# Patient Record
Sex: MALE | Race: WHITE | NOT HISPANIC OR LATINO | Employment: FULL TIME | URBAN - METROPOLITAN AREA
[De-identification: names, ages, dates, MRNs, and addresses within clinical notes are randomized per-mention and may not be internally consistent; named-entity substitution may affect disease eponyms.]

---

## 2017-11-27 ENCOUNTER — OFFICE VISIT (OUTPATIENT)
Dept: FAMILY MEDICINE CLINIC | Facility: CLINIC | Age: 22
End: 2017-11-27

## 2017-11-27 VITALS
BODY MASS INDEX: 24.79 KG/M2 | DIASTOLIC BLOOD PRESSURE: 80 MMHG | WEIGHT: 167.4 LBS | TEMPERATURE: 98.3 F | SYSTOLIC BLOOD PRESSURE: 136 MMHG | HEART RATE: 108 BPM | HEIGHT: 69 IN | OXYGEN SATURATION: 98 %

## 2017-11-27 DIAGNOSIS — F41.9 ANXIETY: Primary | ICD-10-CM

## 2017-11-27 PROCEDURE — 99213 OFFICE O/P EST LOW 20 MIN: CPT | Performed by: NURSE PRACTITIONER

## 2017-11-27 NOTE — PROGRESS NOTES
"Subjective   Billy Cardona is a 22 y.o. male.     History of Present Illness   Patient presenting to the office today to get back on a medication for anxiety.  Colostomy was here he was given Zoloft he only uses a 4 little while but stopped it because he believes that made him feel drowsy while he was at work.  He was taking the medication in the morning.  He does believe that it helped him with anxiety because his mother people around him noticed that he was much better while he was on the medication.  The following portions of the patient's history were reviewed and updated as appropriate: allergies, current medications, past social history and problem list.    Review of Systems   Constitutional: Negative for fever.   All other systems reviewed and are negative.      Objective   /80 (BP Location: Right arm, Patient Position: Sitting)  Pulse 108  Temp 98.3 °F (36.8 °C)  Ht 69\" (175.3 cm)  Wt 167 lb 6.4 oz (75.9 kg)  SpO2 98%  BMI 24.72 kg/m2  Physical Exam   Constitutional: He is oriented to person, place, and time. Vital signs are normal. He appears well-developed and well-nourished. No distress.   HENT:   Head: Normocephalic.   Cardiovascular: Normal rate, regular rhythm and normal heart sounds.    Pulmonary/Chest: Effort normal and breath sounds normal.   Neurological: He is alert and oriented to person, place, and time. Gait normal.   Psychiatric: He has a normal mood and affect. His behavior is normal. Judgment and thought content normal.   Vitals reviewed.      Assessment/Plan   Problem List Items Addressed This Visit        Other    Anxiety - Primary    Relevant Medications    sertraline (ZOLOFT) 50 MG tablet           rto in 6 weeks if needed   Take med at night   "

## 2018-03-30 ENCOUNTER — OFFICE VISIT (OUTPATIENT)
Dept: FAMILY MEDICINE CLINIC | Facility: CLINIC | Age: 23
End: 2018-03-30

## 2018-03-30 VITALS
HEIGHT: 69 IN | BODY MASS INDEX: 25.45 KG/M2 | WEIGHT: 171.8 LBS | TEMPERATURE: 98.2 F | DIASTOLIC BLOOD PRESSURE: 84 MMHG | SYSTOLIC BLOOD PRESSURE: 130 MMHG | OXYGEN SATURATION: 98 % | HEART RATE: 93 BPM

## 2018-03-30 DIAGNOSIS — R21 RASH: ICD-10-CM

## 2018-03-30 DIAGNOSIS — F41.9 ANXIETY: Primary | ICD-10-CM

## 2018-03-30 PROCEDURE — 99213 OFFICE O/P EST LOW 20 MIN: CPT | Performed by: NURSE PRACTITIONER

## 2018-03-30 RX ORDER — BUPROPION HYDROCHLORIDE 150 MG/1
150 TABLET ORAL DAILY
Qty: 90 TABLET | Refills: 3 | Status: SHIPPED | OUTPATIENT
Start: 2018-03-30 | End: 2019-08-09

## 2018-03-30 RX ORDER — CEPHALEXIN 500 MG/1
500 CAPSULE ORAL 3 TIMES DAILY
Qty: 30 CAPSULE | Refills: 0 | Status: SHIPPED | OUTPATIENT
Start: 2018-03-30 | End: 2019-08-09

## 2018-03-30 NOTE — PROGRESS NOTES
"Subjective   Billy Cardona is a 22 y.o. male.     History of Present Illness   Patient presenting to the office today to follow-up on Zoloft which has done wonders for his anxiety but is causing him to have erectile dysfunction.  He would like to discuss a different medication that will control his anxiety but will not have this side effect.    Patient also presenting to the office with a rash on his lower abdomen that is been there for at least 2 weeks she's not use any medication for it.  The following portions of the patient's history were reviewed and updated as appropriate: allergies, current medications, past social history and problem list.    Review of Systems   Skin: Positive for rash.   All other systems reviewed and are negative.      Objective   /84 (BP Location: Right arm, Patient Position: Sitting)   Pulse 93   Temp 98.2 °F (36.8 °C)   Ht 175.3 cm (69.02\")   Wt 77.9 kg (171 lb 12.8 oz)   SpO2 98%   BMI 25.36 kg/m²   Physical Exam   Constitutional: He is oriented to person, place, and time. Vital signs are normal. He appears well-developed and well-nourished. No distress.   HENT:   Head: Normocephalic.   Cardiovascular: Normal rate, regular rhythm and normal heart sounds.    Pulmonary/Chest: Effort normal and breath sounds normal.   Neurological: He is alert and oriented to person, place, and time. Gait normal.   Psychiatric: He has a normal mood and affect. His behavior is normal. Judgment and thought content normal.   Vitals reviewed.    Rash appears as if it is infection of the hair follicles  Assessment/Plan   Problem List Items Addressed This Visit        Musculoskeletal and Integument    Rash    Relevant Medications    cephalexin (KEFLEX) 500 MG capsule       Other    Anxiety - Primary    Relevant Medications    buPROPion XL (WELLBUTRIN XL) 150 MG 24 hr tablet      Other Visit Diagnoses    None.       rto prn   stop Zoloft and start Wellbutrin  "

## 2019-08-09 ENCOUNTER — OFFICE VISIT (OUTPATIENT)
Dept: FAMILY MEDICINE CLINIC | Facility: CLINIC | Age: 24
End: 2019-08-09

## 2019-08-09 VITALS
HEART RATE: 75 BPM | HEIGHT: 69 IN | TEMPERATURE: 97.9 F | BODY MASS INDEX: 27.16 KG/M2 | WEIGHT: 183.4 LBS | SYSTOLIC BLOOD PRESSURE: 136 MMHG | DIASTOLIC BLOOD PRESSURE: 90 MMHG | OXYGEN SATURATION: 100 %

## 2019-08-09 DIAGNOSIS — Z13.1 SCREENING FOR DIABETES MELLITUS: ICD-10-CM

## 2019-08-09 DIAGNOSIS — R19.7 DIARRHEA, UNSPECIFIED TYPE: ICD-10-CM

## 2019-08-09 DIAGNOSIS — Z13.0 SCREENING FOR IRON DEFICIENCY ANEMIA: ICD-10-CM

## 2019-08-09 DIAGNOSIS — K21.9 GASTROESOPHAGEAL REFLUX DISEASE WITHOUT ESOPHAGITIS: ICD-10-CM

## 2019-08-09 DIAGNOSIS — R14.0 ABDOMINAL BLOATING: Primary | ICD-10-CM

## 2019-08-09 DIAGNOSIS — Z13.6 SCREENING FOR ISCHEMIC HEART DISEASE: ICD-10-CM

## 2019-08-09 PROCEDURE — 99214 OFFICE O/P EST MOD 30 MIN: CPT | Performed by: NURSE PRACTITIONER

## 2019-08-09 RX ORDER — OMEPRAZOLE 20 MG/1
20 CAPSULE, DELAYED RELEASE ORAL DAILY
Qty: 90 CAPSULE | Refills: 3 | Status: SHIPPED | OUTPATIENT
Start: 2019-08-09 | End: 2019-10-29

## 2019-08-09 NOTE — PROGRESS NOTES
"Subjective   Billy Cardona is a 23 y.o. male.     History of Present Illness   Abdominal bloating and back and forth with diarrhea and constipation.  He used tums all the time.  Pepto helps.  Doesn't matter what he eats.  Sometimes its first thing in the morning.  Increase in gas and heartburn. No abdominal pain. This has been happening for years and was tested for lactose intolerance years ago and said he was a little sensitive so stopped with diary products and that didn't help.  Now eats diary. No blood in stool. No v,n,f    The following portions of the patient's history were reviewed and updated as appropriate: allergies, current medications, past social history and problem list.    Review of Systems   Constitutional: Negative for fever.   Respiratory: Negative for cough and shortness of breath.    Cardiovascular: Negative for chest pain.   Gastrointestinal: Positive for abdominal distention and diarrhea. Negative for abdominal pain.   Neurological: Negative for dizziness.       Objective   /90 (BP Location: Left arm, Patient Position: Sitting)   Pulse 75   Temp 97.9 °F (36.6 °C)   Ht 175.3 cm (69\")   Wt 83.2 kg (183 lb 6.4 oz)   SpO2 100%   BMI 27.08 kg/m²   Physical Exam   Constitutional: He is oriented to person, place, and time. Vital signs are normal. He appears well-developed and well-nourished. No distress.   HENT:   Head: Normocephalic.   Cardiovascular: Normal rate, regular rhythm and normal heart sounds.   Pulmonary/Chest: Effort normal and breath sounds normal.   Neurological: He is alert and oriented to person, place, and time. Gait normal.   Psychiatric: He has a normal mood and affect. His behavior is normal. Judgment and thought content normal.   Vitals reviewed.      Assessment/Plan      Diagnosis Plan   1. Abdominal bloating  Food Allergy Profile   2. Gastroesophageal reflux disease without esophagitis  omeprazole (PRILOSEC) 20 MG capsule   3. Diarrhea, unspecified type  Food Allergy " Profile   4. Screening for iron deficiency anemia  CBC & Differential   5. Screening for diabetes mellitus  Comprehensive Metabolic Panel   6. Screening for ischemic heart disease  Lipid Panel With LDL / HDL Ratio     rto in 6 weeks   Follow up after labs     MAK Smith  8/9/2019

## 2019-08-13 LAB
ALBUMIN SERPL-MCNC: 5 G/DL (ref 3.5–5.2)
ALBUMIN/GLOB SERPL: 2.3 G/DL
ALP SERPL-CCNC: 78 U/L (ref 39–117)
ALT SERPL-CCNC: 37 U/L (ref 1–41)
AST SERPL-CCNC: 22 U/L (ref 1–40)
BASOPHILS # BLD AUTO: 0.03 10*3/MM3 (ref 0–0.2)
BASOPHILS NFR BLD AUTO: 0.5 % (ref 0–1.5)
BILIRUB SERPL-MCNC: 0.9 MG/DL (ref 0.2–1.2)
BUN SERPL-MCNC: 11 MG/DL (ref 6–20)
BUN/CREAT SERPL: 10.2 (ref 7–25)
CALCIUM SERPL-MCNC: 9.4 MG/DL (ref 8.6–10.5)
CHLORIDE SERPL-SCNC: 103 MMOL/L (ref 98–107)
CHOLEST SERPL-MCNC: 160 MG/DL (ref 0–200)
CLAM IGE QN: <0.1 KU/L
CO2 SERPL-SCNC: 24 MMOL/L (ref 22–29)
CODFISH IGE QN: <0.1 KU/L
CONV CLASS DESCRIPTION: ABNORMAL
CORN IGE QN: 0.12 KU/L
COW MILK IGE QN: <0.1 KU/L
CREAT SERPL-MCNC: 1.08 MG/DL (ref 0.76–1.27)
EGG WHITE IGE QN: <0.1 KU/L
EOSINOPHIL # BLD AUTO: 0.05 10*3/MM3 (ref 0–0.4)
EOSINOPHIL NFR BLD AUTO: 0.8 % (ref 0.3–6.2)
ERYTHROCYTE [DISTWIDTH] IN BLOOD BY AUTOMATED COUNT: 12.8 % (ref 12.3–15.4)
GLOBULIN SER CALC-MCNC: 2.2 GM/DL
GLUCOSE SERPL-MCNC: 97 MG/DL (ref 65–99)
HCT VFR BLD AUTO: 55.8 % (ref 37.5–51)
HDLC SERPL-MCNC: 44 MG/DL (ref 40–60)
HGB BLD-MCNC: 17.8 G/DL (ref 13–17.7)
IMM GRANULOCYTES # BLD AUTO: 0.02 10*3/MM3 (ref 0–0.05)
IMM GRANULOCYTES NFR BLD AUTO: 0.3 % (ref 0–0.5)
LDLC SERPL CALC-MCNC: 104 MG/DL (ref 0–100)
LDLC/HDLC SERPL: 2.35 {RATIO}
LYMPHOCYTES # BLD AUTO: 2.68 10*3/MM3 (ref 0.7–3.1)
LYMPHOCYTES NFR BLD AUTO: 45 % (ref 19.6–45.3)
MCH RBC QN AUTO: 29.9 PG (ref 26.6–33)
MCHC RBC AUTO-ENTMCNC: 31.9 G/DL (ref 31.5–35.7)
MCV RBC AUTO: 93.8 FL (ref 79–97)
MONOCYTES # BLD AUTO: 0.5 10*3/MM3 (ref 0.1–0.9)
MONOCYTES NFR BLD AUTO: 8.4 % (ref 5–12)
NEUTROPHILS # BLD AUTO: 2.67 10*3/MM3 (ref 1.7–7)
NEUTROPHILS NFR BLD AUTO: 45 % (ref 42.7–76)
NRBC BLD AUTO-RTO: 0 /100 WBC (ref 0–0.2)
PEANUT IGE QN: 0.1 KU/L
PLATELET # BLD AUTO: 260 10*3/MM3 (ref 140–450)
POTASSIUM SERPL-SCNC: 4.8 MMOL/L (ref 3.5–5.2)
PROT SERPL-MCNC: 7.2 G/DL (ref 6–8.5)
RBC # BLD AUTO: 5.95 10*6/MM3 (ref 4.14–5.8)
SCALLOP IGE QN: 0.11 KU/L
SESAME SEED IGE QN: 0.12 KU/L
SHRIMP IGE QN: <0.1 KU/L
SODIUM SERPL-SCNC: 142 MMOL/L (ref 136–145)
SOYBEAN IGE QN: <0.1 KU/L
TRIGL SERPL-MCNC: 62 MG/DL (ref 0–150)
VLDLC SERPL CALC-MCNC: 12.4 MG/DL
WALNUT IGE QN: <0.1 KU/L
WBC # BLD AUTO: 5.95 10*3/MM3 (ref 3.4–10.8)
WHEAT IGE QN: <0.1 KU/L

## 2019-09-20 ENCOUNTER — OFFICE VISIT (OUTPATIENT)
Dept: FAMILY MEDICINE CLINIC | Facility: CLINIC | Age: 24
End: 2019-09-20

## 2019-09-20 VITALS
SYSTOLIC BLOOD PRESSURE: 120 MMHG | TEMPERATURE: 97.8 F | WEIGHT: 182.8 LBS | HEIGHT: 69 IN | DIASTOLIC BLOOD PRESSURE: 74 MMHG | HEART RATE: 83 BPM | BODY MASS INDEX: 27.08 KG/M2 | OXYGEN SATURATION: 98 %

## 2019-09-20 DIAGNOSIS — R14.0 ABDOMINAL BLOATING: ICD-10-CM

## 2019-09-20 DIAGNOSIS — K21.9 GASTROESOPHAGEAL REFLUX DISEASE WITHOUT ESOPHAGITIS: Primary | ICD-10-CM

## 2019-09-20 DIAGNOSIS — R19.7 DIARRHEA, UNSPECIFIED TYPE: ICD-10-CM

## 2019-09-20 PROBLEM — Z13.1 SCREENING FOR DIABETES MELLITUS: Status: RESOLVED | Noted: 2019-08-09 | Resolved: 2019-09-20

## 2019-09-20 PROBLEM — Z13.6 SCREENING FOR ISCHEMIC HEART DISEASE: Status: RESOLVED | Noted: 2019-08-09 | Resolved: 2019-09-20

## 2019-09-20 PROCEDURE — 99213 OFFICE O/P EST LOW 20 MIN: CPT | Performed by: NURSE PRACTITIONER

## 2019-09-20 NOTE — PROGRESS NOTES
"Subjective   Billy Cardona is a 23 y.o. male.     History of Present Illness   Patient presented back to the office today to discuss adding Prilosec for his GI symptoms of bloating heartburn and diarrhea.  Patient states when he first started the medication he thought it was can work well but it ended up not helping him at all he still having off-and-on diarrhea off and on bloating.  He stopped eating the foods that he had a slight allergy to him that food allergy profile has not made any difference at all.  He states that his stomach does hurt worse in the mornings when he gets up before he eats anything at all  The following portions of the patient's history were reviewed and updated as appropriate: allergies, current medications, past social history and problem list.    Review of Systems   HENT: Positive for postnasal drip, rhinorrhea and sinus pressure.    All other systems reviewed and are negative.      Objective   /74 (BP Location: Left arm, Patient Position: Sitting)   Pulse 83   Temp 97.8 °F (36.6 °C)   Ht 175.3 cm (69.02\")   Wt 82.9 kg (182 lb 12.8 oz)   SpO2 98%   BMI 26.98 kg/m²   Physical Exam   Constitutional: He is oriented to person, place, and time. Vital signs are normal. He appears well-developed and well-nourished. No distress.   HENT:   Head: Normocephalic.   Cardiovascular: Normal rate, regular rhythm and normal heart sounds.   Pulmonary/Chest: Effort normal and breath sounds normal.   Neurological: He is alert and oriented to person, place, and time. Gait normal.   Psychiatric: He has a normal mood and affect. His behavior is normal. Judgment and thought content normal.   Vitals reviewed.      Assessment/Plan      Diagnosis Plan   1. Gastroesophageal reflux disease without esophagitis  Ambulatory Referral to Gastroenterology   2. Diarrhea, unspecified type  Ambulatory Referral to Gastroenterology   3. Abdominal bloating  Ambulatory Referral to Gastroenterology     Referral to GI  rto " if needed    Nikos Roth, APRN  9/20/2019

## 2019-10-17 ENCOUNTER — OFFICE VISIT (OUTPATIENT)
Dept: GASTROENTEROLOGY | Facility: CLINIC | Age: 24
End: 2019-10-17

## 2019-10-17 VITALS
SYSTOLIC BLOOD PRESSURE: 126 MMHG | BODY MASS INDEX: 27.13 KG/M2 | TEMPERATURE: 98.2 F | HEIGHT: 69 IN | DIASTOLIC BLOOD PRESSURE: 82 MMHG | WEIGHT: 183.2 LBS

## 2019-10-17 DIAGNOSIS — K52.9 CHRONIC DIARRHEA: ICD-10-CM

## 2019-10-17 DIAGNOSIS — E80.4 GILBERT'S SYNDROME: ICD-10-CM

## 2019-10-17 DIAGNOSIS — R79.89 LFT ELEVATION: Primary | ICD-10-CM

## 2019-10-17 PROCEDURE — 99203 OFFICE O/P NEW LOW 30 MIN: CPT | Performed by: INTERNAL MEDICINE

## 2019-10-17 RX ORDER — CALCIUM CARBONATE 200(500)MG
1 TABLET,CHEWABLE ORAL AS NEEDED
COMMUNITY

## 2019-10-17 RX ORDER — BISMUTH SUBSALICYLATE 262 MG/1
524 TABLET, CHEWABLE ORAL AS NEEDED
COMMUNITY

## 2019-10-17 NOTE — PROGRESS NOTES
Chief Complaint   Patient presents with   • Heartburn     Billy Cardona is a 24 y.o. male who presents with a history of abdominal pain and diarrhea  HPI     Patient 24-year-old male with history of abdominal pain and diarrhea described to others as heartburn though on discussion does not have reflux burning pain intermittently in the epigastric area unrelated to eating or drinking but associated with diarrhea improved with Pepto-Bismol.  Patient denies weight loss no fever chills no bright red blood per rectum or melena.    Past Medical History:   Diagnosis Date   • Anxiety        Current Outpatient Medications:   •  bismuth subsalicylate (PEPTO BISMOL) 262 MG chewable tablet, Chew 524 mg As Needed for Indigestion., Disp: , Rfl:   •  calcium carbonate (TUMS) 500 MG chewable tablet, Chew 1 tablet As Needed for Indigestion or Heartburn., Disp: , Rfl:   •  omeprazole (PRILOSEC) 20 MG capsule, Take 1 capsule by mouth Daily., Disp: 90 capsule, Rfl: 3  No Known Allergies  Social History     Socioeconomic History   • Marital status: Single     Spouse name: Not on file   • Number of children: Not on file   • Years of education: Not on file   • Highest education level: Not on file   Tobacco Use   • Smoking status: Never Smoker   • Smokeless tobacco: Never Used   Substance and Sexual Activity   • Alcohol use: Yes     Family History   Problem Relation Age of Onset   • MIKEL disease Father      Review of Systems   Constitutional: Negative.    HENT: Negative.    Eyes: Negative.    Respiratory: Negative.    Cardiovascular: Negative.    Gastrointestinal: Positive for abdominal distention, abdominal pain and diarrhea. Negative for anal bleeding, blood in stool, constipation, nausea and rectal pain.   Musculoskeletal: Negative.    Skin: Negative.    Allergic/Immunologic: Negative.    Hematological: Negative.      Vitals:    10/17/19 1005   BP: 126/82   Temp: 98.2 °F (36.8 °C)     Physical Exam   Constitutional: He is oriented to  person, place, and time. He appears well-developed and well-nourished.   HENT:   Head: Normocephalic and atraumatic.   Eyes: Conjunctivae and EOM are normal. Pupils are equal, round, and reactive to light. No scleral icterus.   Neck: Normal range of motion. No tracheal deviation present.   Cardiovascular: Normal rate and regular rhythm. Exam reveals no gallop and no friction rub.   No murmur heard.  Pulmonary/Chest: Effort normal and breath sounds normal. No respiratory distress. He has no wheezes. He has no rales.   Abdominal: Soft. Bowel sounds are normal. He exhibits no distension and no mass. There is no tenderness. There is no rebound and no guarding.   Musculoskeletal: Normal range of motion. He exhibits no edema.   Neurological: He is alert and oriented to person, place, and time. No cranial nerve deficit.   Skin: Skin is warm and dry. No pallor.   Psychiatric: He has a normal mood and affect. Judgment normal.   Nursing note and vitals reviewed.    Diagnoses and all orders for this visit:    LFT elevation  -     US Liver; Future    Chronic diarrhea  -     Case Request; Standing  -     Implement Anesthesia orders day of procedure.; Standing  -     Obtain informed consent; Standing  -     Case Request    Gilbert's syndrome    Other orders  -     bismuth subsalicylate (PEPTO BISMOL) 262 MG chewable tablet; Chew 524 mg As Needed for Indigestion.  -     calcium carbonate (TUMS) 500 MG chewable tablet; Chew 1 tablet As Needed for Indigestion or Heartburn.    Patient 24-year-old male with history of abdominal pain particularly in the epigastric region going on for about 8 years with chronic diarrhea.  Patient reports improvement with Pepto-Bismol and momentary improvement with Tums.  Patient reports rectal urgency and loose stools increased with stress.  Patient reports no particular change with eating or drinking.  Patient denies actual heartburn or reflux noted.  Patient given trial of Nexium with no change.  Will  arrange flexible sigmoidoscopy and in view of patient's abnormal LFTs we will get him a right upper quadrant ultrasound.  Patient reports he drinks only rarely maybe once a week 1 or 2 beers usually in the summertime now not at all.  Patient's bilirubin elevation is consistent with Gilbert's but AST was elevated as well.  Await testing for further recommendations.

## 2019-10-28 ENCOUNTER — HOSPITAL ENCOUNTER (OUTPATIENT)
Dept: ULTRASOUND IMAGING | Facility: HOSPITAL | Age: 24
Discharge: HOME | End: 2019-10-28
Admitting: INTERNAL MEDICINE

## 2019-10-28 DIAGNOSIS — R79.89 LFT ELEVATION: ICD-10-CM

## 2019-10-28 PROCEDURE — 76705 ECHO EXAM OF ABDOMEN: CPT

## 2019-10-30 ENCOUNTER — ANESTHESIA (OUTPATIENT)
Dept: GASTROENTEROLOGY | Facility: HOSPITAL | Age: 24
End: 2019-10-30

## 2019-10-30 ENCOUNTER — ANESTHESIA EVENT (OUTPATIENT)
Dept: GASTROENTEROLOGY | Facility: HOSPITAL | Age: 24
End: 2019-10-30

## 2019-10-30 ENCOUNTER — HOSPITAL ENCOUNTER (OUTPATIENT)
Facility: HOSPITAL | Age: 24
Setting detail: HOSPITAL OUTPATIENT SURGERY
Discharge: HOME | End: 2019-10-30
Attending: INTERNAL MEDICINE | Admitting: INTERNAL MEDICINE

## 2019-10-30 VITALS
RESPIRATION RATE: 16 BRPM | WEIGHT: 179.8 LBS | HEART RATE: 78 BPM | HEIGHT: 69 IN | SYSTOLIC BLOOD PRESSURE: 126 MMHG | TEMPERATURE: 97.8 F | BODY MASS INDEX: 26.63 KG/M2 | OXYGEN SATURATION: 99 % | DIASTOLIC BLOOD PRESSURE: 74 MMHG

## 2019-10-30 DIAGNOSIS — K52.9 CHRONIC DIARRHEA: ICD-10-CM

## 2019-10-30 PROCEDURE — 25010000002 PROPOFOL 10 MG/ML EMULSION: Performed by: REGISTERED NURSE

## 2019-10-30 PROCEDURE — 88305 TISSUE EXAM BY PATHOLOGIST: CPT | Performed by: INTERNAL MEDICINE

## 2019-10-30 PROCEDURE — 45380 COLONOSCOPY AND BIOPSY: CPT | Performed by: INTERNAL MEDICINE

## 2019-10-30 RX ORDER — LIDOCAINE HYDROCHLORIDE 10 MG/ML
0.5 INJECTION, SOLUTION INFILTRATION; PERINEURAL ONCE AS NEEDED
Status: DISCONTINUED | OUTPATIENT
Start: 2019-10-30 | End: 2019-10-30 | Stop reason: HOSPADM

## 2019-10-30 RX ORDER — PROPOFOL 10 MG/ML
VIAL (ML) INTRAVENOUS CONTINUOUS PRN
Status: DISCONTINUED | OUTPATIENT
Start: 2019-10-30 | End: 2019-10-30 | Stop reason: SURG

## 2019-10-30 RX ORDER — SODIUM CHLORIDE 0.9 % (FLUSH) 0.9 %
10 SYRINGE (ML) INJECTION AS NEEDED
Status: DISCONTINUED | OUTPATIENT
Start: 2019-10-30 | End: 2019-10-30 | Stop reason: HOSPADM

## 2019-10-30 RX ORDER — LIDOCAINE HYDROCHLORIDE 20 MG/ML
INJECTION, SOLUTION INFILTRATION; PERINEURAL AS NEEDED
Status: DISCONTINUED | OUTPATIENT
Start: 2019-10-30 | End: 2019-10-30 | Stop reason: SURG

## 2019-10-30 RX ORDER — SODIUM CHLORIDE, SODIUM LACTATE, POTASSIUM CHLORIDE, CALCIUM CHLORIDE 600; 310; 30; 20 MG/100ML; MG/100ML; MG/100ML; MG/100ML
1000 INJECTION, SOLUTION INTRAVENOUS CONTINUOUS
Status: DISCONTINUED | OUTPATIENT
Start: 2019-10-30 | End: 2019-10-30 | Stop reason: HOSPADM

## 2019-10-30 RX ORDER — PROPOFOL 10 MG/ML
VIAL (ML) INTRAVENOUS AS NEEDED
Status: DISCONTINUED | OUTPATIENT
Start: 2019-10-30 | End: 2019-10-30 | Stop reason: SURG

## 2019-10-30 RX ADMIN — SODIUM CHLORIDE, POTASSIUM CHLORIDE, SODIUM LACTATE AND CALCIUM CHLORIDE 1000 ML: 600; 310; 30; 20 INJECTION, SOLUTION INTRAVENOUS at 10:31

## 2019-10-30 RX ADMIN — PROPOFOL 150 MCG/KG/MIN: 10 INJECTION, EMULSION INTRAVENOUS at 11:17

## 2019-10-30 RX ADMIN — LIDOCAINE HYDROCHLORIDE 40 MG: 20 INJECTION, SOLUTION INFILTRATION; PERINEURAL at 11:17

## 2019-10-30 RX ADMIN — PROPOFOL 60 MG: 10 INJECTION, EMULSION INTRAVENOUS at 11:17

## 2019-10-30 NOTE — ANESTHESIA POSTPROCEDURE EVALUATION
"Patient: Billy Cardona    Procedure Summary     Date:  10/30/19 Room / Location:   [facility]    Anesthesia Start:  1109 Anesthesia Stop:  1130    Procedure:  FLEXIBLE SIGMOIDOSCOPY into cecum and terminal ileum with biopsies (N/A ) Diagnosis:       Chronic diarrhea      Internal hemorrhoids without complication      (Chronic diarrhea [K52.9])    Surgeon:  Mohit Boyer MD Provider:  Zeina Martin MD    Anesthesia Type:  MAC ASA Status:  2          Anesthesia Type: MAC  Last vitals  BP   126/74 (10/30/19 1135)   Temp   36.6 °C (97.8 °F) (10/30/19 1125)   Pulse   78 (10/30/19 1135)   Resp   16 (10/30/19 1135)     SpO2   99 % (10/30/19 1135)     Post Anesthesia Care and Evaluation    Patient location during evaluation: PHASE II  Patient participation: complete - patient participated  Pain management: adequate  Airway patency: patent  Anesthetic complications: No anesthetic complications    Cardiovascular status: acceptable  Respiratory status: acceptable  Hydration status: acceptable    Comments: /74   Pulse 78   Temp 36.6 °C (97.8 °F)   Resp 16   Ht 175.3 cm (69\")   Wt 81.6 kg (179 lb 12.8 oz)   SpO2 99%   BMI 26.55 kg/m²         "

## 2019-10-30 NOTE — ANESTHESIA PREPROCEDURE EVALUATION
Anesthesia Evaluation     Patient summary reviewed and Nursing notes reviewed   no history of anesthetic complications:  NPO Solid Status: > 8 hours             Airway   Mallampati: II  TM distance: >3 FB  Neck ROM: full  Dental - normal exam     Pulmonary - negative pulmonary ROS and normal exam   Cardiovascular - negative cardio ROS and normal exam  Exercise tolerance: good (4-7 METS)        Neuro/Psych  (+) psychiatric history Anxiety,     GI/Hepatic/Renal/Endo    (+)  GERD,      ROS Comment: Rl smaciej    Musculoskeletal (-) negative ROS    Abdominal  - normal exam    Bowel sounds: normal.   Substance History - negative use     OB/GYN negative ob/gyn ROS         Other                        Anesthesia Plan    ASA 2     MAC     Anesthetic plan, all risks, benefits, and alternatives have been provided, discussed and informed consent has been obtained with: patient.    Plan discussed with CRNA.

## 2019-10-30 NOTE — ANESTHESIA POSTPROCEDURE EVALUATION
"Patient: Billy Cardona    Procedure Summary     Date:  10/30/19 Room / Location:   [facility]    Anesthesia Start:  1109 Anesthesia Stop:  1130    Procedure:  FLEXIBLE SIGMOIDOSCOPY into cecum and terminal ileum with biopsies (N/A ) Diagnosis:       Chronic diarrhea      Internal hemorrhoids without complication      (Chronic diarrhea [K52.9])    Surgeon:  Mohit Boyer MD Provider:  Zeina Martin MD    Anesthesia Type:  MAC ASA Status:  2          Anesthesia Type: MAC  Last vitals  BP   126/74 (10/30/19 1135)   Temp   36.6 °C (97.8 °F) (10/30/19 1125)   Pulse   78 (10/30/19 1135)   Resp   16 (10/30/19 1135)     SpO2   99 % (10/30/19 1135)     Post Anesthesia Care and Evaluation    Patient location during evaluation: PHASE II  Patient participation: complete - patient participated  Level of consciousness: awake  Pain management: adequate  Airway patency: patent  Anesthetic complications: No anesthetic complications    Cardiovascular status: acceptable  Respiratory status: acceptable  Hydration status: acceptable    Comments: /74   Pulse 78   Temp 36.6 °C (97.8 °F)   Resp 16   Ht 175.3 cm (69\")   Wt 81.6 kg (179 lb 12.8 oz)   SpO2 99%   BMI 26.55 kg/m²         "

## 2019-11-01 LAB
CYTO UR: NORMAL
LAB AP CASE REPORT: NORMAL
PATH REPORT.FINAL DX SPEC: NORMAL
PATH REPORT.GROSS SPEC: NORMAL

## 2019-11-07 ENCOUNTER — TELEPHONE (OUTPATIENT)
Dept: GASTROENTEROLOGY | Facility: CLINIC | Age: 24
End: 2019-11-07

## 2019-11-07 NOTE — TELEPHONE ENCOUNTER
----- Message from Mayra Shepherd sent at 11/7/2019  3:14 PM EST -----  Regarding: Results  Contact: [phone]  Pt is calling regarding path results

## 2019-11-25 NOTE — TELEPHONE ENCOUNTER
Notes recorded by Mohit Boyer MD on 11/24/2019 at 4:15 PM EST  Biopsies negative for pathology.  Treat for IBS, try xifaxan 550 tid for 14 days with 2 refills.  If first course not improved will change to pamelor 10 hs and f/u ov

## 2020-02-04 ENCOUNTER — OFFICE VISIT (OUTPATIENT)
Dept: FAMILY MEDICINE CLINIC | Facility: CLINIC | Age: 25
End: 2020-02-04

## 2020-02-04 VITALS
OXYGEN SATURATION: 100 % | BODY MASS INDEX: 27.55 KG/M2 | WEIGHT: 186 LBS | HEART RATE: 85 BPM | SYSTOLIC BLOOD PRESSURE: 120 MMHG | DIASTOLIC BLOOD PRESSURE: 78 MMHG | HEIGHT: 69 IN | TEMPERATURE: 98 F

## 2020-02-04 DIAGNOSIS — F41.9 ANXIETY: Primary | ICD-10-CM

## 2020-02-04 PROCEDURE — 99213 OFFICE O/P EST LOW 20 MIN: CPT | Performed by: NURSE PRACTITIONER

## 2020-02-04 RX ORDER — BUPROPION HYDROCHLORIDE 150 MG/1
150 TABLET ORAL DAILY
Qty: 90 TABLET | Refills: 3 | Status: SHIPPED | OUTPATIENT
Start: 2020-02-04 | End: 2021-06-25

## 2020-02-04 NOTE — PROGRESS NOTES
"Subjective   Billy Cardona is a 24 y.o. male.     History of Present Illness   Patient presented to the office today to restart his anxiety medication.  He is been on tried on different medications in the past for anxiety and the only one that worked was the last one that was given to him by me which was Wellbutrin.  He would like to restart this medication to see if he can cut down on his anxiety and in turn help him with his IBS symptoms.  He is seeing the GI specialist for that and he is getting it under control but he would like to try this as well.  No other problems or complaints today  The following portions of the patient's history were reviewed and updated as appropriate: allergies, current medications, past social history and problem list.    Review of Systems   Psychiatric/Behavioral: The patient is nervous/anxious.    All other systems reviewed and are negative.      Objective   /78 (BP Location: Right arm, Patient Position: Sitting)   Pulse 85   Temp 98 °F (36.7 °C)   Ht 175.3 cm (69.02\")   Wt 84.4 kg (186 lb)   SpO2 100%   BMI 27.45 kg/m²   Physical Exam   Constitutional: He is oriented to person, place, and time. Vital signs are normal. He appears well-developed and well-nourished. No distress.   HENT:   Head: Normocephalic.   Cardiovascular: Normal rate, regular rhythm and normal heart sounds.   Pulmonary/Chest: Effort normal and breath sounds normal.   Neurological: He is alert and oriented to person, place, and time. Gait normal.   Psychiatric: He has a normal mood and affect. His behavior is normal. Judgment and thought content normal.   Vitals reviewed.      Assessment/Plan      Diagnosis Plan   1. Anxiety  buPROPion XL (WELLBUTRIN XL) 150 MG 24 hr tablet     Wellbutrin return to the office in 4 to 6 weeks if needed or not working how you would like for her to work.  Nikos Roth, APRN  2/4/2020  "

## 2021-06-25 ENCOUNTER — OFFICE VISIT (OUTPATIENT)
Dept: FAMILY MEDICINE CLINIC | Facility: CLINIC | Age: 26
End: 2021-06-25

## 2021-06-25 VITALS
OXYGEN SATURATION: 99 % | HEIGHT: 69 IN | BODY MASS INDEX: 27.4 KG/M2 | DIASTOLIC BLOOD PRESSURE: 88 MMHG | WEIGHT: 185 LBS | SYSTOLIC BLOOD PRESSURE: 148 MMHG | TEMPERATURE: 97.6 F | HEART RATE: 74 BPM

## 2021-06-25 DIAGNOSIS — I10 ESSENTIAL HYPERTENSION: Primary | ICD-10-CM

## 2021-06-25 PROCEDURE — 99213 OFFICE O/P EST LOW 20 MIN: CPT | Performed by: NURSE PRACTITIONER

## 2021-06-25 RX ORDER — HYDROCHLOROTHIAZIDE 12.5 MG/1
12.5 CAPSULE, GELATIN COATED ORAL DAILY
Qty: 90 CAPSULE | Refills: 3 | Status: SHIPPED | OUTPATIENT
Start: 2021-06-25 | End: 2021-07-23

## 2021-06-25 NOTE — PROGRESS NOTES
"Chief Complaint  Hypertension (pt had elevated BP of 150/something ~ 1 month ago, has since been checking Bp averaging 140s/80s. Does have worsening headaches)    Subjective          Billy Cardona presents to [facility]  History of Present Illness  Patient presenting to the office today with concerns regarding blood pressure being elevated consistently over the past 2 to 4 weeks.  Patient has been checking his blood pressure at home due to the fact that his mother and father both have hypertension and has been getting readings in the upper 140s low 150s over high 80s.  Patient also has been experiencing headaches during times of elevated blood pressure.    Objective   Vital Signs:   /88 (BP Location: Right arm, Patient Position: Sitting)   Pulse 74   Temp 97.6 °F (36.4 °C)   Ht 175.3 cm (69\")   Wt 83.9 kg (185 lb)   SpO2 99%   BMI 27.32 kg/m²     Physical Exam  Vitals reviewed.   Constitutional:       General: He is not in acute distress.     Appearance: He is well-developed.   HENT:      Head: Normocephalic.   Cardiovascular:      Rate and Rhythm: Normal rate and regular rhythm.      Heart sounds: Normal heart sounds.   Pulmonary:      Effort: Pulmonary effort is normal.      Breath sounds: Normal breath sounds.   Neurological:      Mental Status: He is alert and oriented to person, place, and time.      Gait: Gait normal.   Psychiatric:         Behavior: Behavior normal.         Thought Content: Thought content normal.         Judgment: Judgment normal.        Result Review :                 Assessment and Plan    Diagnoses and all orders for this visit:    1. Essential hypertension (Primary)  -     hydroCHLOROthiazide (MICROZIDE) 12.5 MG capsule; Take 1 capsule by mouth Daily.  Dispense: 90 capsule; Refill: 3        Follow Up   Return in about 4 weeks (around 7/23/2021) for Recheck.  Patient was given instructions and counseling regarding his condition or for health " maintenance advice. Please see specific information pulled into the AVS if appropriate.     Start hydrochlorothiazide 12.5 mg daily return to the office in 4 weeks continue to check blood pressure at home    Mask and googles worn

## 2021-07-23 ENCOUNTER — OFFICE VISIT (OUTPATIENT)
Dept: FAMILY MEDICINE CLINIC | Facility: CLINIC | Age: 26
End: 2021-07-23

## 2021-07-23 VITALS
HEART RATE: 87 BPM | OXYGEN SATURATION: 98 % | SYSTOLIC BLOOD PRESSURE: 140 MMHG | HEIGHT: 69 IN | WEIGHT: 182.6 LBS | BODY MASS INDEX: 27.05 KG/M2 | TEMPERATURE: 98 F | DIASTOLIC BLOOD PRESSURE: 76 MMHG

## 2021-07-23 DIAGNOSIS — Z13.0 SCREENING FOR IRON DEFICIENCY ANEMIA: ICD-10-CM

## 2021-07-23 DIAGNOSIS — Z13.6 SCREENING FOR ISCHEMIC HEART DISEASE: ICD-10-CM

## 2021-07-23 DIAGNOSIS — Z13.1 SCREENING FOR DIABETES MELLITUS: ICD-10-CM

## 2021-07-23 DIAGNOSIS — I10 ESSENTIAL HYPERTENSION: Primary | ICD-10-CM

## 2021-07-23 PROCEDURE — 99213 OFFICE O/P EST LOW 20 MIN: CPT | Performed by: NURSE PRACTITIONER

## 2021-07-23 RX ORDER — AMLODIPINE BESYLATE 5 MG/1
5 TABLET ORAL DAILY
Qty: 30 TABLET | Refills: 1 | Status: SHIPPED | OUTPATIENT
Start: 2021-07-23 | End: 2021-08-12 | Stop reason: SDUPTHER

## 2021-07-23 NOTE — PROGRESS NOTES
"Chief Complaint  Hypertension (Patient is here for 4 week f/u on b/p ( wore mask and goggles) )    Subjective          Billy Cardona presents to [facility]  History of Present Illness  1. HTN-patient presenting to the office today to follow-up on adding hydrochlorothiazide 12.5 mg daily for his blood pressure.  Patient states he has been taking it as directed but his blood pressure has not improved he is consistently running in the 140s over mid 90s and at times the 150s over 90s.  Patient also has complaints of the medication causing him to have cramping and increased headaches.      Objective   Vital Signs:   /76   Pulse 87   Temp 98 °F (36.7 °C)   Ht 175.3 cm (69.02\")   Wt 82.8 kg (182 lb 9.6 oz)   SpO2 98%   BMI 26.95 kg/m²     Physical Exam  Vitals reviewed.   Constitutional:       General: He is not in acute distress.     Appearance: He is well-developed.   HENT:      Head: Normocephalic.   Cardiovascular:      Rate and Rhythm: Normal rate and regular rhythm.      Heart sounds: Normal heart sounds.   Pulmonary:      Effort: Pulmonary effort is normal.      Breath sounds: Normal breath sounds.   Neurological:      Mental Status: He is alert and oriented to person, place, and time.      Gait: Gait normal.   Psychiatric:         Behavior: Behavior normal.         Thought Content: Thought content normal.         Judgment: Judgment normal.        Result Review :                 Assessment and Plan    Diagnoses and all orders for this visit:    1. Essential hypertension (Primary)  -     amLODIPine (NORVASC) 5 MG tablet; Take 1 tablet by mouth Daily.  Dispense: 30 tablet; Refill: 1    2. Screening for iron deficiency anemia  -     CBC & Differential    3. Screening for diabetes mellitus  -     Comprehensive Metabolic Panel    4. Screening for ischemic heart disease  -     Lipid Panel With LDL / HDL Ratio        Follow Up   No follow-ups on file.  Patient was given instructions " and counseling regarding his condition or for health maintenance advice. Please see specific information pulled into the AVS if appropriate.     Stop hydrochlorothiazide start amlodipine 5 mg daily return to the office in 4 weeks.  Follow-up after lab results    Mask and googles worn

## 2021-07-24 LAB
ALBUMIN SERPL-MCNC: 5 G/DL (ref 3.5–5.2)
ALBUMIN/GLOB SERPL: 2.5 G/DL
ALP SERPL-CCNC: 71 U/L (ref 39–117)
ALT SERPL-CCNC: 27 U/L (ref 1–41)
AST SERPL-CCNC: 22 U/L (ref 1–40)
BASOPHILS # BLD AUTO: 0.03 10*3/MM3 (ref 0–0.2)
BASOPHILS NFR BLD AUTO: 0.6 % (ref 0–1.5)
BILIRUB SERPL-MCNC: 1 MG/DL (ref 0–1.2)
BUN SERPL-MCNC: 11 MG/DL (ref 6–20)
BUN/CREAT SERPL: 10.7 (ref 7–25)
CALCIUM SERPL-MCNC: 9.8 MG/DL (ref 8.6–10.5)
CHLORIDE SERPL-SCNC: 106 MMOL/L (ref 98–107)
CHOLEST SERPL-MCNC: 151 MG/DL (ref 0–200)
CO2 SERPL-SCNC: 25.3 MMOL/L (ref 22–29)
CREAT SERPL-MCNC: 1.03 MG/DL (ref 0.76–1.27)
EOSINOPHIL # BLD AUTO: 0.02 10*3/MM3 (ref 0–0.4)
EOSINOPHIL NFR BLD AUTO: 0.4 % (ref 0.3–6.2)
ERYTHROCYTE [DISTWIDTH] IN BLOOD BY AUTOMATED COUNT: 12.6 % (ref 12.3–15.4)
GLOBULIN SER CALC-MCNC: 2 GM/DL
GLUCOSE SERPL-MCNC: 86 MG/DL (ref 65–99)
HCT VFR BLD AUTO: 53.8 % (ref 37.5–51)
HDLC SERPL-MCNC: 39 MG/DL (ref 40–60)
HGB BLD-MCNC: 17.6 G/DL (ref 13–17.7)
IMM GRANULOCYTES # BLD AUTO: 0.01 10*3/MM3 (ref 0–0.05)
IMM GRANULOCYTES NFR BLD AUTO: 0.2 % (ref 0–0.5)
LDLC SERPL CALC-MCNC: 101 MG/DL (ref 0–100)
LDLC/HDLC SERPL: 2.61 {RATIO}
LYMPHOCYTES # BLD AUTO: 2.08 10*3/MM3 (ref 0.7–3.1)
LYMPHOCYTES NFR BLD AUTO: 42.2 % (ref 19.6–45.3)
MCH RBC QN AUTO: 30 PG (ref 26.6–33)
MCHC RBC AUTO-ENTMCNC: 32.7 G/DL (ref 31.5–35.7)
MCV RBC AUTO: 91.7 FL (ref 79–97)
MONOCYTES # BLD AUTO: 0.41 10*3/MM3 (ref 0.1–0.9)
MONOCYTES NFR BLD AUTO: 8.3 % (ref 5–12)
NEUTROPHILS # BLD AUTO: 2.38 10*3/MM3 (ref 1.7–7)
NEUTROPHILS NFR BLD AUTO: 48.3 % (ref 42.7–76)
NRBC BLD AUTO-RTO: 0 /100 WBC (ref 0–0.2)
PLATELET # BLD AUTO: 246 10*3/MM3 (ref 140–450)
POTASSIUM SERPL-SCNC: 5 MMOL/L (ref 3.5–5.2)
PROT SERPL-MCNC: 7 G/DL (ref 6–8.5)
RBC # BLD AUTO: 5.87 10*6/MM3 (ref 4.14–5.8)
SODIUM SERPL-SCNC: 143 MMOL/L (ref 136–145)
TRIGL SERPL-MCNC: 51 MG/DL (ref 0–150)
VLDLC SERPL CALC-MCNC: 11 MG/DL (ref 5–40)
WBC # BLD AUTO: 4.93 10*3/MM3 (ref 3.4–10.8)

## 2021-08-12 DIAGNOSIS — I10 ESSENTIAL HYPERTENSION: ICD-10-CM

## 2021-08-12 RX ORDER — AMLODIPINE BESYLATE 5 MG/1
5 TABLET ORAL DAILY
Qty: 90 TABLET | Refills: 3 | Status: SHIPPED | OUTPATIENT
Start: 2021-08-12 | End: 2022-05-20 | Stop reason: SDUPTHER

## 2021-08-12 NOTE — TELEPHONE ENCOUNTER
Rx Refill Note  Requested Prescriptions     Pending Prescriptions Disp Refills   • amLODIPine (NORVASC) 5 MG tablet 30 tablet 1     Sig: Take 1 tablet by mouth Daily.      Last office visit with prescribing clinician: 7/23/2021      Next office visit with prescribing clinician: 8/19/2021            Petra Lee MA  08/12/21, 10:57 EDT

## 2022-05-20 ENCOUNTER — OFFICE VISIT (OUTPATIENT)
Dept: INTERNAL MEDICINE | Age: 27
End: 2022-05-20

## 2022-05-20 VITALS
BODY MASS INDEX: 26.51 KG/M2 | HEART RATE: 104 BPM | TEMPERATURE: 98.2 F | DIASTOLIC BLOOD PRESSURE: 88 MMHG | SYSTOLIC BLOOD PRESSURE: 144 MMHG | HEIGHT: 69 IN | WEIGHT: 179 LBS | OXYGEN SATURATION: 99 %

## 2022-05-20 DIAGNOSIS — R19.7 DIARRHEA, UNSPECIFIED TYPE: ICD-10-CM

## 2022-05-20 DIAGNOSIS — I10 ESSENTIAL HYPERTENSION: Primary | ICD-10-CM

## 2022-05-20 DIAGNOSIS — B07.9 WART OF HAND: ICD-10-CM

## 2022-05-20 DIAGNOSIS — K21.9 GASTROESOPHAGEAL REFLUX DISEASE WITHOUT ESOPHAGITIS: ICD-10-CM

## 2022-05-20 PROCEDURE — 99395 PREV VISIT EST AGE 18-39: CPT | Performed by: NURSE PRACTITIONER

## 2022-05-20 PROCEDURE — 99214 OFFICE O/P EST MOD 30 MIN: CPT | Performed by: NURSE PRACTITIONER

## 2022-05-20 RX ORDER — AMLODIPINE BESYLATE 5 MG/1
5 TABLET ORAL DAILY
Qty: 30 TABLET | Refills: 0 | Status: SHIPPED | OUTPATIENT
Start: 2022-05-20 | End: 2022-06-19

## 2022-05-20 NOTE — PROGRESS NOTES
"    I N T E R N A L  M E D I C I N E  Gina Harkins, APRN       ENCOUNTER DATE:  05/20/2022    Billy Cardona / 26 y.o. / male    CHIEF COMPLAINT     Establish Care (PREVIOUS TIM EDWARD ), GERD, Hypertension      VITALS     Visit Vitals  /88 (BP Location: Right arm)   Pulse 104   Temp 98.2 °F (36.8 °C)   Ht 175.3 cm (69.02\")   Wt 81.2 kg (179 lb)   SpO2 99%   BMI 26.42 kg/m²       BP Readings from Last 3 Encounters:   05/20/22 144/88   07/23/21 140/76   06/25/21 148/88     Wt Readings from Last 3 Encounters:   05/20/22 81.2 kg (179 lb)   07/23/21 82.8 kg (182 lb 9.6 oz)   06/25/21 83.9 kg (185 lb)      Body mass index is 26.42 kg/m².    Blood pressure readings recorded on patient flowsheet:  No flowsheet data found.     MEDICATIONS     Current Outpatient Medications on File Prior to Visit   Medication Sig Dispense Refill   • bismuth subsalicylate (PEPTO BISMOL) 262 MG chewable tablet Chew 524 mg As Needed for Indigestion.     • calcium carbonate (TUMS) 500 MG chewable tablet Chew 1 tablet As Needed for Indigestion or Heartburn.     • [DISCONTINUED] amLODIPine (NORVASC) 5 MG tablet Take 1 tablet by mouth Daily. 90 tablet 3     No current facility-administered medications on file prior to visit.         HISTORY OF PRESENT ILLNESS      Billy is a 26 year old male whom presents for annual health maintenance visit, hypertension, GERD    Hypertension: Resume Amlodipine 5mg daily as has not been taking it. The problem is un-controlled. Pertinent negatives include no anxiety, blurred vision, chest pain, headaches, peripheral edema, PND, shortness of breath or sweats. Risk factors for coronary artery disease include post-menopausal state, dyslipidemia and diabetes mellitus. Current antihypertension treatment includes ACE inhibitors. Instructed to keep daily BP log for NP.     GERD: continue pepto bismol as needed. Positive for occasional diarrhea, no nausea or vomiting, mild reflux. Patient states seen by GI and " Endoscopy normal. States has tried multiple GERD medications in the past without relief. Discussion of Hx GB disease in family and following. If continues to be an issue would try carafate or linzess.         · General health: fair  · Lifestyle:  · Attempting to lose weight?: No   · Diet: eats moderately healthy  · Exercise: very active at work/home  · Tobacco: Chews Tobacco   · Alcohol: occasional/infrequent  · Work: Full-time  · Reproductive health:  · Sexually active?: No   · Concern for STD?: No   · Sexual problems?: No problems   · Sees Urologist?: No   · Depression Screening:      PHQ-2/PHQ-9 Depression Screening 5/20/2022   Retired Total Score -   Little Interest or Pleasure in Doing Things 0-->not at all   Feeling Down, Depressed or Hopeless 0-->not at all   PHQ-9: Brief Depression Severity Measure Score 0         PHQ-2: 0 (Not depressed)     PHQ-9: 0 (Negative screening for depression)    Patient Care Team:  Gina Harkins APRN as PCP - General (Family Medicine)  ______________________________________________________________________    ALLERGIES  No Known Allergies     PFSH:     The following portions of the patient's history were reviewed and updated as appropriate: Allergies / Current Medications / Past Medical History / Surgical History / Social History / Family History    PROBLEM LIST   Patient Active Problem List   Diagnosis   • Anxiety   • Excessive sweating   • Rash   • Abdominal bloating   • Gastroesophageal reflux disease without esophagitis   • Diarrhea   • Screening for ischemic heart disease   • Screening for diabetes mellitus   • Gilbert's syndrome   • Chronic diarrhea   • Essential hypertension   • Wart of hand       PAST MEDICAL HISTORY  Past Medical History:   Diagnosis Date   • Alternating constipation and diarrhea    • Anxiety    • Blood in stool        SURGICAL HISTORY  Past Surgical History:   Procedure Laterality Date   • NO PAST SURGERIES     • SIGMOIDOSCOPY N/A 10/30/2019     Procedure: FLEXIBLE SIGMOIDOSCOPY into cecum and terminal ileum with biopsies;  Surgeon: Mohit Boyer MD;  Location: [facility] ENDOSCOPY;  Service: Gastroenterology       SOCIAL HISTORY  Social History     Socioeconomic History   • Marital status:    Tobacco Use   • Smoking status: Never Smoker   • Smokeless tobacco: Current User     Types: Chew   • Tobacco comment: CHEWS TOBACCO   Vaping Use   • Vaping Use: Never used   Substance and Sexual Activity   • Alcohol use: Not Currently     Comment: OCCATIONALLY   • Drug use: Never   • Sexual activity: Yes       FAMILY HISTORY  Family History   Problem Relation Age of Onset   • MIKEL disease Father    • Malig Hyperthermia Neg Hx        IMMUNIZATION HISTORY    There is no immunization history on file for this patient.      REVIEW OF SYSTEMS     Review of Systems   Constitutional: Negative.    HENT: Negative.    Eyes: Negative.    Respiratory: Negative.    Cardiovascular: Negative.    Gastrointestinal: Positive for diarrhea.   Endocrine: Negative.    Genitourinary: Negative.    Musculoskeletal: Negative.    Skin:        Wart on right index finger   Allergic/Immunologic: Negative.    Neurological: Negative.    Hematological: Negative.    Psychiatric/Behavioral: Negative.        PHYSICAL EXAMINATION     Physical Exam  Vitals reviewed.   Constitutional:       Appearance: Normal appearance.   HENT:      Head: Normocephalic.      Right Ear: Tympanic membrane normal.      Left Ear: Tympanic membrane normal.      Nose: Nose normal.      Mouth/Throat:      Mouth: Mucous membranes are moist.   Eyes:      Extraocular Movements: Extraocular movements intact.      Pupils: Pupils are equal, round, and reactive to light.   Cardiovascular:      Rate and Rhythm: Normal rate and regular rhythm.      Pulses: Normal pulses.      Heart sounds: Normal heart sounds.   Pulmonary:      Effort: Pulmonary effort is normal.      Breath sounds: Normal breath sounds.   Abdominal:       General: Abdomen is flat. Bowel sounds are normal.   Musculoskeletal:         General: Normal range of motion.      Cervical back: Normal range of motion and neck supple.   Skin:     Capillary Refill: Capillary refill takes less than 2 seconds.      Findings: Lesion present.      Comments: Wart right index finger with tenderness, no redness or drainage noted   Neurological:      Mental Status: He is alert and oriented to person, place, and time.   Psychiatric:         Mood and Affect: Mood normal.         Behavior: Behavior normal.         Thought Content: Thought content normal.         Judgment: Judgment normal.         REVIEWED DATA      Labs:    Lab Results   Component Value Date     07/23/2021    K 5.0 07/23/2021    CALCIUM 9.8 07/23/2021    AST 22 07/23/2021    ALT 27 07/23/2021    BUN 11 07/23/2021    CREATININE 1.03 07/23/2021    CREATININE 1.08 08/09/2019    EGFRIFNONA 88 07/23/2021    EGFRIFAFRI 107 07/23/2021       Lab Results   Component Value Date    GLUCOSE 86 07/23/2021       No results found for: PSA    No results found for: TESTOSTERONE, TESTOSTEROTT, TESTFRE    Lab Results   Component Value Date     (H) 07/23/2021    HDL 39 (L) 07/23/2021    TRIG 51 07/23/2021       No components found for: [id number]    Lab Results   Component Value Date    WBC 4.93 07/23/2021    HGB 17.6 07/23/2021    MCV 91.7 07/23/2021     07/23/2021       No results found for: PROTEIN, GLUCOSEU, BLOODU, NITRITEU, LEUKOCYTESUR     No results found for: HEPCVIRUSABY    Imaging:           Medical Tests:           ASSESSMENT & PLAN     ANNUAL WELLNESS EXAM / PHYSICAL     Other medical problems addressed today:  Problem List Items Addressed This Visit        Cardiac and Vasculature    Essential hypertension - Primary    Relevant Medications    amLODIPine (NORVASC) 5 MG tablet    Other Relevant Orders    CBC w AUTO Differential    Comprehensive metabolic panel    Lipid panel       Gastrointestinal Abdominal      Gastroesophageal reflux disease without esophagitis    Relevant Medications    calcium carbonate (TUMS) 500 MG chewable tablet    Diarrhea       Other    Wart of hand    Relevant Medications    salicylic acid 17 % gel    Other Relevant Orders    Ambulatory Referral to Dermatology          Summary/Discussion:     · Referral to Dermatology for treatment of wart    Return in about 4 weeks (around 6/17/2022) for Recheck.      HEALTHCARE MAINTENANCE ISSUES       Cancer Screening:  · Colon: Initial/Next screening at age: CURRENT  · Repeat colon cancer screening: every 10 years  · Prostate: No screening needed at this time  · Testicular: Recommended monthly self exam  · Skin: Monthly self skin examination, annual exam by health professional  · Lung: Does not meet criteria for lung cancer screening.   · Other:    Screening Labs & Tests:  · Lab results reviewed & discussed with with patient or orders placed today.  · EKG:  · CV Screening: Lipid panel  · DEXA (75+ or risk factors):   · HEP C (If born [birth year]69-8537 or risk factors): Not indicated  · Other:     Immunization/Vaccinations (to be given today unless deferred by patient)  · Influenza: Patient deferred/declined flu vaccine (recommended annual vaccination)  · Hepatitis A: Verify immunization records  · Hepatitis B: Verify immunization records  · Tetanus/Pertussis: Verify immunization records  · Pneumovax/PCV: Not needed at this time  · Shingles: Not needed at this time  · COVID: COVID: Plans to not take the vaccine  Lifestyle Counseling:  · Lifestyle Modifications: Continue good lifestyle choices/modifications, Begin progressive aerobic exercise program 3-5 days a week, Quit chewing tobacco, Continue to abstain/curtail drinking and Reduce exposure to stress if possible  · Safety Issues: Always wear seatbelt, Avoid texting while driving   · Use sunscreen, regular skin examination  · Recommended annual dental/vision examination.  · Emotional/Stress/Sleep: Reviewed and   given when appropriate      Health Maintenance   Topic Date Due   • ANNUAL PHYSICAL  Never done   • COVID-19 Vaccine (1) Never done   • TDAP/TD VACCINES (1 - Tdap) Never done   • HEPATITIS C SCREENING  Never done   • INFLUENZA VACCINE  08/01/2022   • COLORECTAL CANCER SCREENING  10/30/2045   • Pneumococcal Vaccine 0-64  Aged Out           *Examiner was wearing mask protection during the entire duration of the visit. Patient was masked the entire time. Minimum social distance of 6 ft maintained entire visit except if physical contact was necessary as documented.       Template created by MAK Robles

## 2022-05-21 LAB
ALBUMIN SERPL-MCNC: 5.1 G/DL (ref 4.1–5.2)
ALBUMIN/GLOB SERPL: 2.6 {RATIO} (ref 1.2–2.2)
ALP SERPL-CCNC: 67 IU/L (ref 44–121)
ALT SERPL-CCNC: 26 IU/L (ref 0–44)
AST SERPL-CCNC: 22 IU/L (ref 0–40)
BASOPHILS # BLD AUTO: 0 X10E3/UL (ref 0–0.2)
BASOPHILS NFR BLD AUTO: 0 %
BILIRUB SERPL-MCNC: 1.3 MG/DL (ref 0–1.2)
BUN SERPL-MCNC: 12 MG/DL (ref 6–20)
BUN/CREAT SERPL: 11 (ref 9–20)
CALCIUM SERPL-MCNC: 9.5 MG/DL (ref 8.7–10.2)
CHLORIDE SERPL-SCNC: 102 MMOL/L (ref 96–106)
CHOLEST SERPL-MCNC: 141 MG/DL (ref 100–199)
CO2 SERPL-SCNC: 24 MMOL/L (ref 20–29)
CREAT SERPL-MCNC: 1.09 MG/DL (ref 0.76–1.27)
EGFRCR SERPLBLD CKD-EPI 2021: 96 ML/MIN/1.73
EOSINOPHIL # BLD AUTO: 0 X10E3/UL (ref 0–0.4)
EOSINOPHIL NFR BLD AUTO: 0 %
ERYTHROCYTE [DISTWIDTH] IN BLOOD BY AUTOMATED COUNT: 12.7 % (ref 11.6–15.4)
GLOBULIN SER CALC-MCNC: 2 G/DL (ref 1.5–4.5)
GLUCOSE SERPL-MCNC: 82 MG/DL (ref 65–99)
HCT VFR BLD AUTO: 51.2 % (ref 37.5–51)
HDLC SERPL-MCNC: 42 MG/DL
HGB BLD-MCNC: 17.2 G/DL (ref 13–17.7)
IMM GRANULOCYTES # BLD AUTO: 0 X10E3/UL (ref 0–0.1)
IMM GRANULOCYTES NFR BLD AUTO: 0 %
LDLC SERPL CALC-MCNC: 85 MG/DL (ref 0–99)
LYMPHOCYTES # BLD AUTO: 2.1 X10E3/UL (ref 0.7–3.1)
LYMPHOCYTES NFR BLD AUTO: 22 %
MCH RBC QN AUTO: 31.1 PG (ref 26.6–33)
MCHC RBC AUTO-ENTMCNC: 33.6 G/DL (ref 31.5–35.7)
MCV RBC AUTO: 93 FL (ref 79–97)
MONOCYTES # BLD AUTO: 0.7 X10E3/UL (ref 0.1–0.9)
MONOCYTES NFR BLD AUTO: 8 %
NEUTROPHILS # BLD AUTO: 6.4 X10E3/UL (ref 1.4–7)
NEUTROPHILS NFR BLD AUTO: 70 %
PLATELET # BLD AUTO: 246 X10E3/UL (ref 150–450)
POTASSIUM SERPL-SCNC: 4.3 MMOL/L (ref 3.5–5.2)
PROT SERPL-MCNC: 7.1 G/DL (ref 6–8.5)
RBC # BLD AUTO: 5.53 X10E6/UL (ref 4.14–5.8)
SODIUM SERPL-SCNC: 142 MMOL/L (ref 134–144)
TRIGL SERPL-MCNC: 69 MG/DL (ref 0–149)
VLDLC SERPL CALC-MCNC: 14 MG/DL (ref 5–40)
WBC # BLD AUTO: 9.3 X10E3/UL (ref 3.4–10.8)

## 2023-04-04 ENCOUNTER — OFFICE VISIT (OUTPATIENT)
Dept: INTERNAL MEDICINE | Age: 28
End: 2023-04-04
Payer: COMMERCIAL

## 2023-04-04 VITALS
SYSTOLIC BLOOD PRESSURE: 124 MMHG | HEART RATE: 89 BPM | DIASTOLIC BLOOD PRESSURE: 82 MMHG | HEIGHT: 69 IN | OXYGEN SATURATION: 99 % | TEMPERATURE: 98.6 F | WEIGHT: 179.4 LBS | BODY MASS INDEX: 26.57 KG/M2

## 2023-04-04 DIAGNOSIS — F41.9 ANXIETY: ICD-10-CM

## 2023-04-04 DIAGNOSIS — I10 ESSENTIAL HYPERTENSION: ICD-10-CM

## 2023-04-04 DIAGNOSIS — K58.0 IRRITABLE BOWEL SYNDROME WITH DIARRHEA: Primary | ICD-10-CM

## 2023-04-04 RX ORDER — HYDROXYZINE HYDROCHLORIDE 25 MG/1
25 TABLET, FILM COATED ORAL 3 TIMES DAILY PRN
Qty: 60 TABLET | Refills: 0 | Status: SHIPPED | OUTPATIENT
Start: 2023-04-04 | End: 2023-04-05

## 2023-04-04 NOTE — PROGRESS NOTES
Answers for HPI/ROS submitted by the patient on 4/3/2023  What is the primary reason for your visit?: Abdominal Pain  Chronicity: recurrent  Onset: 1 to 4 weeks ago  Onset quality: sudden  Frequency: 2 to 4 times per day  Progression since onset: unchanged  Pain location: generalized abdominal region  Pain - numeric: 5/10  Pain quality: burning  Radiates to: does not radiate  anorexia: Yes  arthralgias: No  belching: Yes  constipation: No  diarrhea: Yes  dysuria: No  fever: No  flatus: Yes  frequency: Yes  headaches: Yes  nausea: Yes  weight loss: No  vomiting: No  Aggravated by: certain positions, movement  Relieved by: belching, bowel movements, passing flatus, standing        I N T E R N A L  M E D I C I MAK Aggarwal    ENCOUNTER DATE:  04/04/2023    Billy COTTO Brendan / 27 y.o. / male      CHIEF COMPLAINT / REASON FOR OFFICE VISIT     Diarrhea (Pt has taken pepto which seemed to help and some immodium) and Abdominal Pain      ASSESSMENT & PLAN     Diagnoses and all orders for this visit:    1. Irritable bowel syndrome with diarrhea (Primary)  Assessment & Plan:  Patient with chronic diarrhea related to IBS. Will try Rifaximin for 2 weeks and follow. Instructed high fiber gummies as directed. Instructed stress management and effects on IBS.       2. Essential hypertension  Assessment & Plan:  Hypertension is controlled off of medication. Continue weight loss measures, exercise, and making healthy food choices. Limit your sodium in your diet and alcohol.       3. Anxiety  Assessment & Plan:  Patient states has tried Zoloft and Wellbutrin in the past and unable to tolerate. Will try Hydroxyzine as directed and follow. Patient states does not want a long acting anxiety medication at this time.       Other orders  -     rifAXIMin (XIFAXAN) 200 MG tablet; Take 1 tablet by mouth 3 (Three) Times a Day As Needed (diarrhea from IBS).  Dispense: 60 tablet; Refill: 0  -     hydrOXYzine (ATARAX) 25 MG tablet; Take 1  "tablet by mouth 3 (Three) Times a Day As Needed for Itching.  Dispense: 60 tablet; Refill: 0       SUMMARY/DISCUSSION  • Follow up with Dr Boyer, Gastroenterology as needed    Return in about 6 months (around 10/4/2023) for Annual physical.      VITAL SIGNS     Visit Vitals  /82 (BP Location: Left arm, Patient Position: Sitting, Cuff Size: Adult)   Pulse 89   Temp 98.6 °F (37 °C) (Temporal)   Ht 175.3 cm (69.02\")   Wt 81.4 kg (179 lb 6.4 oz)   SpO2 99%   BMI 26.48 kg/m²           BP Readings from Last 3 Encounters:   04/04/23 124/82   05/20/22 144/88   07/23/21 140/76     Wt Readings from Last 3 Encounters:   04/04/23 81.4 kg (179 lb 6.4 oz)   05/20/22 81.2 kg (179 lb)   07/23/21 82.8 kg (182 lb 9.6 oz)     Body mass index is 26.48 kg/m².    Blood pressure readings recorded on patient flowsheet:  No flowsheet data found.          MEDICATIONS AT THE TIME OF OFFICE VISIT     Current Outpatient Medications on File Prior to Visit   Medication Sig Dispense Refill   • bismuth subsalicylate (PEPTO BISMOL) 262 MG chewable tablet Chew 2 tablets As Needed for Indigestion.     • calcium carbonate (TUMS) 500 MG chewable tablet Chew 1 tablet As Needed for Indigestion or Heartburn.       No current facility-administered medications on file prior to visit.        HISTORY OF PRESENT ILLNESS     27-year-old male being seen today for concerns for abdominal pain that has been ongoing for the past 4 weeks.      GERD/IBS: States has been taking Pepto-Bismol and Imodium with some relief of his diarrhea.  Patient states general abdominal area with some belching, flatus, especially after eating meals.  Patient states he has tried Protonix in the past without relief. Patient states that he feels like it is stress related.     Anxiety/Stress: patient states that he took Wellbutrin and Zoloft in the past and was not able to tolerate them that they made him feel groggy and unable to work. Patient states would like a prn anxiety " medication and agreeable to Hydroxyzine.     Hypertension: stable off of medications. Patient states limiting sodium in his diet and exercises daily as works on his feet 12 hr days.     Mt Maintenance: patient due for his Hep B series and screening- aware. Annual Physical scheduled.     Patient Care Team:  Gina Harkins APRN as PCP - General (Family Medicine)    REVIEW OF SYSTEMS     Review of Systems   Constitutional: Negative for appetite change and fever.   Gastrointestinal: Positive for abdominal distention, diarrhea and nausea. Negative for constipation and vomiting.   Genitourinary: Negative for dysuria and frequency.   Musculoskeletal: Negative for arthralgias.   Neurological: Positive for headaches. Negative for dizziness.   Psychiatric/Behavioral: The patient is nervous/anxious.           PHYSICAL EXAMINATION     Physical Exam  Constitutional:       General: He is not in acute distress.  Cardiovascular:      Rate and Rhythm: Normal rate and regular rhythm.      Pulses: Normal pulses.      Heart sounds: Normal heart sounds.   Pulmonary:      Effort: Pulmonary effort is normal. No respiratory distress.      Breath sounds: Normal breath sounds.   Abdominal:      General: Bowel sounds are normal. There is distension.      Tenderness: There is no abdominal tenderness. There is no right CVA tenderness or left CVA tenderness.   Musculoskeletal:         General: Normal range of motion.   Skin:     General: Skin is warm and dry.   Neurological:      General: No focal deficit present.      Mental Status: He is alert and oriented to person, place, and time. Mental status is at baseline.      Cranial Nerves: No cranial nerve deficit.   Psychiatric:         Mood and Affect: Mood normal.         Behavior: Behavior normal.         Thought Content: Thought content normal.         Judgment: Judgment normal.             REVIEWED DATA     Labs:     Lab Results   Component Value Date     05/20/2022    K 4.3  05/20/2022    CALCIUM 9.5 05/20/2022    AST 22 05/20/2022    ALT 26 05/20/2022    BUN 12 05/20/2022    CREATININE 1.09 05/20/2022    CREATININE 1.03 07/23/2021    CREATININE 1.08 08/09/2019    EGFRIFNONA 88 07/23/2021    EGFRIFAFRI 107 07/23/2021       No results found for: HGBA1C    Lab Results   Component Value Date    LDL 85 05/20/2022     (H) 07/23/2021     (H) 08/09/2019    HDL 42 05/20/2022    HDL 39 (L) 07/23/2021    TRIG 69 05/20/2022    TRIG 51 07/23/2021       No results found for: TSH, FREET4    Lab Results   Component Value Date    WBC 9.3 05/20/2022    HGB 17.2 05/20/2022     05/20/2022       No results found for: MALBCRERATIO       Imaging:           Medical Tests:           Summary of old records / correspondence / consultant report:           Request outside records:           MAK Robles

## 2023-04-04 NOTE — ASSESSMENT & PLAN NOTE
Patient states has tried Zoloft and Wellbutrin in the past and unable to tolerate. Will try Hydroxyzine as directed and follow. Patient states does not want a long acting anxiety medication at this time.

## 2023-04-04 NOTE — ASSESSMENT & PLAN NOTE
Hypertension is controlled off of medication. Continue weight loss measures, exercise, and making healthy food choices. Limit your sodium in your diet and alcohol.

## 2023-04-04 NOTE — ASSESSMENT & PLAN NOTE
Patient with chronic diarrhea related to IBS. Will try Rifaximin for 2 weeks and follow. Instructed high fiber gummies as directed. Instructed stress management and effects on IBS.

## 2023-04-05 ENCOUNTER — TELEPHONE (OUTPATIENT)
Dept: INTERNAL MEDICINE | Age: 28
End: 2023-04-05

## 2023-04-05 RX ORDER — HYDROXYZINE HYDROCHLORIDE 25 MG/1
25 TABLET, FILM COATED ORAL 3 TIMES DAILY PRN
Qty: 60 TABLET | Refills: 0 | Status: SHIPPED | OUTPATIENT
Start: 2023-04-05

## 2023-04-05 NOTE — TELEPHONE ENCOUNTER
Caller: Billy Cardona    Relationship: Self    Best call back number: [phone]    What was the call regarding: PATIENT SATES HE WAS PRESCRIBED 2 NEW MEDICATIONS YESTERDAY 4/4/23  AT HIS APPOINTMENT AND THEY WERE SENT TO [facility] BUT [facility] NOW STATES THAT THEY DO NOT TAKE THE PATIENT'S INSURANCE. PATIENT IS NEEDING THE MEDICATIONS SENT TO [facility] - [place] [address] - [phone]  - [phone] FX    PLEASE CALL AND ADVISE

## 2023-04-06 DIAGNOSIS — K52.9 CHRONIC DIARRHEA OF UNKNOWN ORIGIN: Primary | ICD-10-CM

## 2023-04-06 NOTE — TELEPHONE ENCOUNTER
Caller: Billy Cardona    Relationship: Self    Best call back number: [phone]    What medications are you currently taking:   Current Outpatient Medications on File Prior to Visit   Medication Sig Dispense Refill   • bismuth subsalicylate (PEPTO BISMOL) 262 MG chewable tablet Chew 2 tablets As Needed for Indigestion.     • calcium carbonate (TUMS) 500 MG chewable tablet Chew 1 tablet As Needed for Indigestion or Heartburn.     • hydrOXYzine (ATARAX) 25 MG tablet Take 1 tablet by mouth 3 (Three) Times a Day As Needed for Itching. 60 tablet 0   • rifAXIMin (XIFAXAN) 200 MG tablet Take 1 tablet by mouth 3 (Three) Times a Day As Needed (diarrhea from IBS). 60 tablet 0     No current facility-administered medications on file prior to visit.          Which medication are you concerned about: XIFAXAN    Who prescribed you this medication: RONALD SQUIRES    What are your concerns: PATIENT WAS TOLD BY PHARMACY THAT HIS XIFAXAN WAS NOT COVERED BY INSURANCE. PLEASE ADVISE IF SHE CAN CHANGED THE MEDICATION TO SOMETHING ELSE.

## 2023-04-17 DIAGNOSIS — K58.0 IRRITABLE BOWEL SYNDROME WITH DIARRHEA: Primary | ICD-10-CM

## 2023-04-17 RX ORDER — ELUXADOLINE 75 MG/1
100 TABLET, FILM COATED ORAL 2 TIMES DAILY PRN
Qty: 60 TABLET | Refills: 0 | Status: SHIPPED | OUTPATIENT
Start: 2023-04-17 | End: 2023-04-18

## 2023-04-17 NOTE — TELEPHONE ENCOUNTER
Caller: Billy Cardona    Relationship: Self    Best call back number: [phone]     PLEASE CALL AND ADVISE PATIENT ON THIS.

## 2023-04-18 ENCOUNTER — OFFICE VISIT (OUTPATIENT)
Dept: INTERNAL MEDICINE | Age: 28
End: 2023-04-18
Payer: COMMERCIAL

## 2023-04-18 ENCOUNTER — TELEPHONE (OUTPATIENT)
Dept: INTERNAL MEDICINE | Age: 28
End: 2023-04-18

## 2023-04-18 VITALS
SYSTOLIC BLOOD PRESSURE: 138 MMHG | WEIGHT: 177.6 LBS | DIASTOLIC BLOOD PRESSURE: 90 MMHG | TEMPERATURE: 97.1 F | OXYGEN SATURATION: 99 % | BODY MASS INDEX: 26.31 KG/M2 | HEART RATE: 80 BPM | HEIGHT: 69 IN

## 2023-04-18 DIAGNOSIS — R42 LIGHTHEADEDNESS: ICD-10-CM

## 2023-04-18 DIAGNOSIS — K58.0 IRRITABLE BOWEL SYNDROME WITH DIARRHEA: ICD-10-CM

## 2023-04-18 DIAGNOSIS — F41.9 ANXIETY: ICD-10-CM

## 2023-04-18 DIAGNOSIS — R53.83 OTHER FATIGUE: ICD-10-CM

## 2023-04-18 DIAGNOSIS — K52.9 CHRONIC DIARRHEA: Primary | ICD-10-CM

## 2023-04-18 LAB
ALBUMIN SERPL-MCNC: 4.6 G/DL (ref 3.5–5.2)
ALBUMIN/GLOB SERPL: 2.3 G/DL
ALP SERPL-CCNC: 69 U/L (ref 39–117)
ALT SERPL-CCNC: 41 U/L (ref 1–41)
AST SERPL-CCNC: 18 U/L (ref 1–40)
BASOPHILS # BLD AUTO: 0.04 10*3/MM3 (ref 0–0.2)
BASOPHILS NFR BLD AUTO: 0.7 % (ref 0–1.5)
BILIRUB SERPL-MCNC: 1.4 MG/DL (ref 0–1.2)
BUN SERPL-MCNC: 11 MG/DL (ref 6–20)
BUN/CREAT SERPL: 10.1 (ref 7–25)
CALCIUM SERPL-MCNC: 9.8 MG/DL (ref 8.6–10.5)
CHLORIDE SERPL-SCNC: 103 MMOL/L (ref 98–107)
CHOLEST SERPL-MCNC: 144 MG/DL (ref 0–200)
CO2 SERPL-SCNC: 27.9 MMOL/L (ref 22–29)
CREAT SERPL-MCNC: 1.09 MG/DL (ref 0.76–1.27)
EGFRCR SERPLBLD CKD-EPI 2021: 95.4 ML/MIN/1.73
EOSINOPHIL # BLD AUTO: 0.02 10*3/MM3 (ref 0–0.4)
EOSINOPHIL NFR BLD AUTO: 0.3 % (ref 0.3–6.2)
ERYTHROCYTE [DISTWIDTH] IN BLOOD BY AUTOMATED COUNT: 12.4 % (ref 12.3–15.4)
GLOBULIN SER CALC-MCNC: 2 GM/DL
GLUCOSE SERPL-MCNC: 97 MG/DL (ref 65–99)
HCT VFR BLD AUTO: 49.4 % (ref 37.5–51)
HDLC SERPL-MCNC: 42 MG/DL (ref 40–60)
HGB BLD-MCNC: 17.1 G/DL (ref 13–17.7)
IMM GRANULOCYTES # BLD AUTO: 0.02 10*3/MM3 (ref 0–0.05)
IMM GRANULOCYTES NFR BLD AUTO: 0.3 % (ref 0–0.5)
LDLC SERPL CALC-MCNC: 87 MG/DL (ref 0–100)
LYMPHOCYTES # BLD AUTO: 1.8 10*3/MM3 (ref 0.7–3.1)
LYMPHOCYTES NFR BLD AUTO: 30.7 % (ref 19.6–45.3)
MCH RBC QN AUTO: 31.1 PG (ref 26.6–33)
MCHC RBC AUTO-ENTMCNC: 34.6 G/DL (ref 31.5–35.7)
MCV RBC AUTO: 90 FL (ref 79–97)
MONOCYTES # BLD AUTO: 0.52 10*3/MM3 (ref 0.1–0.9)
MONOCYTES NFR BLD AUTO: 8.9 % (ref 5–12)
NEUTROPHILS # BLD AUTO: 3.46 10*3/MM3 (ref 1.7–7)
NEUTROPHILS NFR BLD AUTO: 59.1 % (ref 42.7–76)
NRBC BLD AUTO-RTO: 0 /100 WBC (ref 0–0.2)
PLATELET # BLD AUTO: 238 10*3/MM3 (ref 140–450)
POTASSIUM SERPL-SCNC: 4.4 MMOL/L (ref 3.5–5.2)
PROT SERPL-MCNC: 6.6 G/DL (ref 6–8.5)
RBC # BLD AUTO: 5.49 10*6/MM3 (ref 4.14–5.8)
SODIUM SERPL-SCNC: 140 MMOL/L (ref 136–145)
T4 FREE SERPL-MCNC: 1.16 NG/DL (ref 0.93–1.7)
TRIGL SERPL-MCNC: 79 MG/DL (ref 0–150)
TSH SERPL DL<=0.005 MIU/L-ACNC: 0.99 UIU/ML (ref 0.27–4.2)
VLDLC SERPL CALC-MCNC: 15 MG/DL (ref 5–40)
WBC # BLD AUTO: 5.86 10*3/MM3 (ref 3.4–10.8)

## 2023-04-18 RX ORDER — LOPERAMIDE HYDROCHLORIDE 2 MG/1
2 CAPSULE ORAL 4 TIMES DAILY PRN
Qty: 30 CAPSULE | Refills: 0 | Status: SHIPPED | OUTPATIENT
Start: 2023-04-18

## 2023-04-18 RX ORDER — LOPERAMIDE HYDROCHLORIDE 2 MG/1
2 CAPSULE ORAL 4 TIMES DAILY PRN
Qty: 30 CAPSULE | Refills: 0 | Status: SHIPPED | OUTPATIENT
Start: 2023-04-18 | End: 2023-04-18 | Stop reason: SDUPTHER

## 2023-04-18 RX ORDER — LOPERAMIDE HYDROCHLORIDE 2 MG/1
2 CAPSULE ORAL 4 TIMES DAILY PRN
COMMUNITY
End: 2023-04-18 | Stop reason: SDUPTHER

## 2023-04-18 RX ORDER — LOPERAMIDE HYDROCHLORIDE 2 MG/1
2 CAPSULE ORAL 4 TIMES DAILY PRN
Status: CANCELLED | OUTPATIENT
Start: 2023-04-18

## 2023-04-18 NOTE — TELEPHONE ENCOUNTER
Caller: Billy Cardona    Relationship: Self    Best call back number: [phone]    Requested Prescriptions:   Requested Prescriptions     Pending Prescriptions Disp Refills   • loperamide (IMODIUM) 2 MG capsule       Sig: Take 1 capsule by mouth 4 (Four) Times a Day As Needed for Diarrhea.        Pharmacy where request should be sent: [id number] - [facility] [address] [address] AT [place] [address] & [place] [address] - [phone] [facility] [phone] FX     Last office visit with prescribing clinician: 4/18/2023   Last telemedicine visit with prescribing clinician: 10/4/2023   Next office visit with prescribing clinician: 10/4/2023     Additional details provided by patient: PATIENT NEEDS PRESCRIPTION CHANGED TO THIS PHARMACY DUE TO INSURANCE ISSUES. PLEASE CALL WHEN SWITCH IS MADE.    Would you like a call back once the refill request has been completed: [x] Yes [] No    If the office needs to give you a call back, can they leave a voicemail: [x] Yes [] No    Merritt Doyle Rep   04/18/23 09:27 EDT

## 2023-04-18 NOTE — ASSESSMENT & PLAN NOTE
Patient states insurance did not approve Rifaximin, will try Loperamide as directed. Patient instructed to cut out chewing gum, sodas. Patient has an upcoming appointment with GI, Dr Boyer for follow up on chronic diarrhea related to IBS.

## 2023-04-18 NOTE — PROGRESS NOTES
"    I N T E R N A L  M E D I C I N E  Gina Harkins, APRN    ENCOUNTER DATE:  04/18/2023    Billy COTTO Brendan / 27 y.o. / male      CHIEF COMPLAINT / REASON FOR OFFICE VISIT     Fatigue and Labs Only (Requesting thyroid labs)      ASSESSMENT & PLAN     Diagnoses and all orders for this visit:    1. Chronic diarrhea (Primary)  Assessment & Plan:  Patient states insurance did not approve Rifaximin, will try Loperamide as directed. Patient instructed to cut out chewing gum, sodas. Patient has an upcoming appointment with GI, Dr Boyer for follow up on chronic diarrhea related to IBS.     Orders:  -     CBC w AUTO Differential  -     Comprehensive metabolic panel  -     Lipid panel    2. Anxiety  Assessment & Plan:  Continue hydroxyzine 25 mg tid as needed.       3. Lightheadedness  Assessment & Plan:  Will obtain labs to assess for possible thyroid disease, dehydration related to diarrhea    Orders:  -     Lipid panel  -     TSH+Free T4    4. Irritable bowel syndrome with diarrhea  -  loperamide (IMODIUM) 2 MG capsule; Take 1 capsule by mouth 4 (Four) Times a Day As Needed for Diarrhea.  Dispense: 30 capsule; Refill: 0     5. Other Fatigue  Will obtain labs to assess for possible thyroid disease, dehydration related to diarrhea    Next Appointment with me: 4/18/2023        VITAL SIGNS     Visit Vitals  /90 (Cuff Size: Adult)   Pulse 80   Temp 97.1 °F (36.2 °C) (Temporal)   Ht 175.3 cm (69.02\")   Wt 80.6 kg (177 lb 9.6 oz)   SpO2 99%   BMI 26.21 kg/m²           BP Readings from Last 3 Encounters:   04/18/23 138/90   04/04/23 124/82   05/20/22 144/88     Wt Readings from Last 3 Encounters:   04/18/23 80.6 kg (177 lb 9.6 oz)   04/04/23 81.4 kg (179 lb 6.4 oz)   05/20/22 81.2 kg (179 lb)     Body mass index is 26.21 kg/m².    Blood pressure readings recorded on patient flowsheet:       View : No data to display.                      MEDICATIONS AT THE TIME OF OFFICE VISIT     Current Outpatient Medications on File Prior " to Visit   Medication Sig Dispense Refill   • calcium carbonate (TUMS) 500 MG chewable tablet Chew 1 tablet As Needed for Indigestion or Heartburn.     • hydrOXYzine (ATARAX) 25 MG tablet Take 1 tablet by mouth 3 (Three) Times a Day As Needed for Itching. 60 tablet 0   • [DISCONTINUED] loperamide (IMODIUM) 2 MG capsule Take 1 capsule by mouth 4 (Four) Times a Day As Needed for Diarrhea.     • [DISCONTINUED] bismuth subsalicylate (PEPTO BISMOL) 262 MG chewable tablet Chew 2 tablets As Needed for Indigestion. (Patient not taking: Reported on 4/18/2023)     • [DISCONTINUED] Eluxadoline (Viberzi) 75 MG tablet Take 100 mg by mouth 2 (Two) Times a Day As Needed (diarrhea with IBS). (Patient not taking: Reported on 4/18/2023) 60 tablet 0   • [DISCONTINUED] rifAXIMin (XIFAXAN) 200 MG tablet Take 1 tablet by mouth 3 (Three) Times a Day As Needed (diarrhea from IBS). 60 tablet 0     No current facility-administered medications on file prior to visit.        HISTORY OF PRESENT ILLNESS   27 year old male being seen today for ongoing concern for diarrhea and new onset lightheadedness.     Chronic diarrhea/IBD is a chronic problem. Patient states has tried over the counter imodium for the diarrhea without relief. States that he is trying to eat healthier with high fiber foods and still having diarrhea. Patient states that the rifaxamin was not covered by his insurance. Patient states has had this same issue in the past and has been evaluated by Gastroenterology in which they did not find anything during the last colonoscopy.    Lightheadedness and fatigue are new concerns. Patient states that he could be just sitting on the couch watching television and experiences what he describes as a lightheaded feeling, Patient denies any headaches or change of vision. Patient states that he has a family history of Thyroid disease and would like to have labs assessed. Patient denies any dizziness or lightheadedness with head turning side to  side or up and down, and no dizziness when going from a lying to a sitting position. Patient states that he sleeps well and awakens feeling refreshed.     Anxiety is a chronic concern. Patient states that he has only taken the Hydroxyzine as needed and does not feel that he needs longer acting medication at this time.        Patient Care Team:  Gina Harkins APRN as PCP - General (Family Medicine)    REVIEW OF SYSTEMS     Review of Systems   Constitutional: Positive for fatigue. Negative for activity change, appetite change, chills and fever.   Respiratory: Negative for cough, chest tightness and shortness of breath.    Cardiovascular: Negative for chest pain and palpitations.   Gastrointestinal: Positive for diarrhea. Negative for abdominal distention, abdominal pain, blood in stool, constipation, nausea and vomiting.   Neurological: Positive for light-headedness. Negative for dizziness and headaches.   Psychiatric/Behavioral: Negative for sleep disturbance. The patient is not nervous/anxious.           PHYSICAL EXAMINATION     Physical Exam  Constitutional:       General: He is not in acute distress.     Appearance: Normal appearance. He is normal weight.   HENT:      Right Ear: Tympanic membrane, ear canal and external ear normal. There is no impacted cerumen.      Left Ear: Tympanic membrane, ear canal and external ear normal. There is no impacted cerumen.      Nose: Nose normal. No congestion or rhinorrhea.      Mouth/Throat:      Mouth: Mucous membranes are moist.      Pharynx: Oropharynx is clear. No oropharyngeal exudate or posterior oropharyngeal erythema.   Cardiovascular:      Rate and Rhythm: Normal rate and regular rhythm.      Pulses: Normal pulses.      Heart sounds: Normal heart sounds.   Pulmonary:      Effort: Pulmonary effort is normal. No respiratory distress.      Breath sounds: Normal breath sounds. No wheezing, rhonchi or rales.   Chest:      Chest wall: No tenderness.   Abdominal:       General: Bowel sounds are normal. There is no distension.      Palpations: Abdomen is soft.      Tenderness: There is no abdominal tenderness. There is no guarding or rebound.   Musculoskeletal:         General: Normal range of motion.      Cervical back: Normal range of motion and neck supple.   Lymphadenopathy:      Cervical: No cervical adenopathy.   Skin:     General: Skin is warm and dry.   Neurological:      Mental Status: He is alert and oriented to person, place, and time. Mental status is at baseline.      Cranial Nerves: No cranial nerve deficit.   Psychiatric:         Mood and Affect: Mood normal.         Behavior: Behavior normal.         Thought Content: Thought content normal.         Judgment: Judgment normal.             REVIEWED DATA     Labs:     Lab Results   Component Value Date     05/20/2022    K 4.3 05/20/2022    CALCIUM 9.5 05/20/2022    AST 22 05/20/2022    ALT 26 05/20/2022    BUN 12 05/20/2022    CREATININE 1.09 05/20/2022    CREATININE 1.03 07/23/2021    CREATININE 1.08 08/09/2019    EGFRIFNONA 88 07/23/2021    EGFRIFAFRI 107 07/23/2021       No results found for: HGBA1C    Lab Results   Component Value Date    LDL 85 05/20/2022     (H) 07/23/2021     (H) 08/09/2019    HDL 42 05/20/2022    HDL 39 (L) 07/23/2021    TRIG 69 05/20/2022    TRIG 51 07/23/2021       No results found for: TSH, FREET4    Lab Results   Component Value Date    WBC 9.3 05/20/2022    HGB 17.2 05/20/2022     05/20/2022       No results found for: MALBCRERATIO         Imaging:           Medical Tests:           Summary of old records / correspondence / consultant report:           Request outside records:           MAK Robles

## 2023-05-02 ENCOUNTER — TELEPHONE (OUTPATIENT)
Dept: INTERNAL MEDICINE | Age: 28
End: 2023-05-02
Payer: COMMERCIAL

## 2023-05-02 RX ORDER — LOPERAMIDE HYDROCHLORIDE 2 MG/1
2 CAPSULE ORAL 4 TIMES DAILY PRN
Qty: 30 CAPSULE | Refills: 0 | Status: SHIPPED | OUTPATIENT
Start: 2023-05-02

## 2023-05-02 NOTE — TELEPHONE ENCOUNTER
Caller: Billy Cardona    Relationship: Self    Best call back number: [phone]    What medication are you requesting: IBS MEDICATION     What are your current symptoms: PATIENT STATES HE TRIED TO GET THIS LAST WEEK BUT THE OTHER PHARMACY IS NOT COVERED WITH INSURANCE     How long have you been experiencing symptoms: 1 WEEK    Have you had these symptoms before:    [x] Yes  [] No    Have you been treated for these symptoms before:   [x] Yes  [] No    If a prescription is needed, what is your preferred pharmacy and phone number: [id number] - [facility] [address] [facility] [address] [place] [address] & [place] [address] - [phone]  - [phone] FX     Additional notes: PLEASE CALL PATIENT WHEN THIS IS DONE.

## 2023-05-11 ENCOUNTER — TELEPHONE (OUTPATIENT)
Dept: INTERNAL MEDICINE | Age: 28
End: 2023-05-11
Payer: COMMERCIAL

## 2023-05-12 DIAGNOSIS — K58.0 IRRITABLE BOWEL SYNDROME WITH DIARRHEA: Primary | ICD-10-CM

## 2023-05-15 ENCOUNTER — TELEPHONE (OUTPATIENT)
Dept: INTERNAL MEDICINE | Age: 28
End: 2023-05-15
Payer: COMMERCIAL

## 2023-05-15 NOTE — TELEPHONE ENCOUNTER
Caller: Billy Cardona A    Relationship to patient: Self    Best call back number: [phone]    Patient is needing: PATIENTS PHARMACY TOLD HIM THAT THEY NEED A PRIOR AUTHORIZATION FOR THE rifAXIMin (XIFAXAN) 200 MG tablet  PLEASE SEND.

## 2023-05-15 NOTE — TELEPHONE ENCOUNTER
I have spoken with pt several times, pt called his insurance and the reason it keeps being denied is the quantity being prescribed, new pa being sent today.

## 2023-07-13 ENCOUNTER — OFFICE VISIT (OUTPATIENT)
Dept: GASTROENTEROLOGY | Facility: CLINIC | Age: 28
End: 2023-07-13
Payer: COMMERCIAL

## 2023-07-13 VITALS
BODY MASS INDEX: 25.98 KG/M2 | TEMPERATURE: 97.5 F | DIASTOLIC BLOOD PRESSURE: 75 MMHG | HEIGHT: 69 IN | HEART RATE: 87 BPM | OXYGEN SATURATION: 97 % | SYSTOLIC BLOOD PRESSURE: 114 MMHG | WEIGHT: 175.4 LBS

## 2023-07-13 DIAGNOSIS — K52.9 CHRONIC DIARRHEA: Primary | ICD-10-CM

## 2023-07-13 RX ORDER — NORTRIPTYLINE HYDROCHLORIDE 10 MG/1
20 CAPSULE ORAL NIGHTLY
Qty: 60 CAPSULE | Refills: 11 | Status: SHIPPED | OUTPATIENT
Start: 2023-07-13

## 2023-11-27 NOTE — PROGRESS NOTES
Chief Complaint   Patient presents with    Diarrhea       Billy Cardona is a  28 y.o. male here for a follow up visit for IBS-d.    HPI    Pt 26 yo male with h/o IBS here for f/u.  Pt treated with xifaxan in the past denies response here for ongoing symptoms    Past Medical History:   Diagnosis Date    Alternating constipation and diarrhea     Anxiety     Blood in stool     Hypertension     Irritable bowel syndrome          Current Outpatient Medications:     calcium carbonate (TUMS) 500 MG chewable tablet, Chew 1 tablet As Needed for Indigestion or Heartburn., Disp: , Rfl:     hydrOXYzine (ATARAX) 25 MG tablet, Take 1 tablet by mouth 3 (Three) Times a Day As Needed for Itching., Disp: 60 tablet, Rfl: 0    loperamide (IMODIUM) 2 MG capsule, Take 1 capsule by mouth 4 (Four) Times a Day As Needed for Diarrhea., Disp: 30 capsule, Rfl: 0    nortriptyline (PAMELOR) 10 MG capsule, Take 2 capsules by mouth Every Night. Take 1 capsule at night for 1 week then 2 capsules nightly, Disp: 60 capsule, Rfl: 11    No Known Allergies    Social History     Socioeconomic History    Marital status:    Tobacco Use    Smoking status: Never    Smokeless tobacco: Current     Types: Chew    Tobacco comments:     CHEWS TOBACCO   Vaping Use    Vaping Use: Never used   Substance and Sexual Activity    Alcohol use: Yes     Alcohol/week: 4.0 standard drinks of alcohol     Types: 4 Cans of beer per week     Comment: OCCATIONALLY    Drug use: Never    Sexual activity: Yes     Partners: Male       Family History   Problem Relation Age of Onset    MIKEL disease Father     Thyroid disease Father     Hyperlipidemia Mother     Malig Hyperthermia Neg Hx        Review of Systems   Constitutional: Negative.    Respiratory: Negative.     Cardiovascular: Negative.    Gastrointestinal:  Positive for abdominal pain and diarrhea. Negative for abdominal distention, anal bleeding, blood in stool, constipation, nausea and rectal pain.   Musculoskeletal:  Negative.    Skin: Negative.    Hematological: Negative.        Vitals:    07/13/23 1545   BP: 114/75   Pulse: 87   Temp: 97.5 °F (36.4 °C)   SpO2: 97%       Physical Exam  Vitals reviewed.   Constitutional:       Appearance: Normal appearance. He is well-developed.   HENT:      Head: Normocephalic and atraumatic.   Eyes:      General: No scleral icterus.     Pupils: Pupils are equal, round, and reactive to light.   Pulmonary:      Effort: Pulmonary effort is normal. No respiratory distress.      Breath sounds: Normal breath sounds.   Abdominal:      General: Bowel sounds are normal. There is no distension.      Palpations: Abdomen is soft.      Tenderness: There is no abdominal tenderness.   Skin:     General: Skin is warm and dry.      Coloration: Skin is not jaundiced.      Findings: No rash.   Neurological:      General: No focal deficit present.      Mental Status: He is alert and oriented to person, place, and time.      Cranial Nerves: No cranial nerve deficit.   Psychiatric:         Mood and Affect: Mood normal.         Behavior: Behavior normal.         Thought Content: Thought content normal.         No visits with results within 2 Month(s) from this visit.   Latest known visit with results is:   Office Visit on 04/18/2023   Component Date Value Ref Range Status    WBC 04/18/2023 5.86  3.40 - 10.80 10*3/mm3 Final    RBC 04/18/2023 5.49  4.14 - 5.80 10*6/mm3 Final    Hemoglobin 04/18/2023 17.1  13.0 - 17.7 g/dL Final    Hematocrit 04/18/2023 49.4  37.5 - 51.0 % Final    MCV 04/18/2023 90.0  79.0 - 97.0 fL Final    MCH 04/18/2023 31.1  26.6 - 33.0 pg Final    MCHC 04/18/2023 34.6  31.5 - 35.7 g/dL Final    RDW 04/18/2023 12.4  12.3 - 15.4 % Final    Platelets 04/18/2023 238  140 - 450 10*3/mm3 Final    Neutrophil Rel % 04/18/2023 59.1  42.7 - 76.0 % Final    Lymphocyte Rel % 04/18/2023 30.7  19.6 - 45.3 % Final    Monocyte Rel % 04/18/2023 8.9  5.0 - 12.0 % Final    Eosinophil Rel % 04/18/2023 0.3  0.3 - 6.2  % Final    Basophil Rel % 04/18/2023 0.7  0.0 - 1.5 % Final    Neutrophils Absolute 04/18/2023 3.46  1.70 - 7.00 10*3/mm3 Final    Lymphocytes Absolute 04/18/2023 1.80  0.70 - 3.10 10*3/mm3 Final    Monocytes Absolute 04/18/2023 0.52  0.10 - 0.90 10*3/mm3 Final    Eosinophils Absolute 04/18/2023 0.02  0.00 - 0.40 10*3/mm3 Final    Basophils Absolute 04/18/2023 0.04  0.00 - 0.20 10*3/mm3 Final    Immature Granulocyte Rel % 04/18/2023 0.3  0.0 - 0.5 % Final    Immature Grans Absolute 04/18/2023 0.02  0.00 - 0.05 10*3/mm3 Final    nRBC 04/18/2023 0.0  0.0 - 0.2 /100 WBC Final    Glucose 04/18/2023 97  65 - 99 mg/dL Final    BUN 04/18/2023 11  6 - 20 mg/dL Final    Creatinine 04/18/2023 1.09  0.76 - 1.27 mg/dL Final    EGFR Result 04/18/2023 95.4  >60.0 mL/min/1.73 Final    BUN/Creatinine Ratio 04/18/2023 10.1  7.0 - 25.0 Final    Sodium 04/18/2023 140  136 - 145 mmol/L Final    Potassium 04/18/2023 4.4  3.5 - 5.2 mmol/L Final    Chloride 04/18/2023 103  98 - 107 mmol/L Final    Total CO2 04/18/2023 27.9  22.0 - 29.0 mmol/L Final    Calcium 04/18/2023 9.8  8.6 - 10.5 mg/dL Final    Total Protein 04/18/2023 6.6  6.0 - 8.5 g/dL Final    Albumin 04/18/2023 4.6  3.5 - 5.2 g/dL Final    Globulin 04/18/2023 2.0  gm/dL Final    A/G Ratio 04/18/2023 2.3  g/dL Final    Total Bilirubin 04/18/2023 1.4 (H)  0.0 - 1.2 mg/dL Final    Alkaline Phosphatase 04/18/2023 69  39 - 117 U/L Final    AST (SGOT) 04/18/2023 18  1 - 40 U/L Final    ALT (SGPT) 04/18/2023 41  1 - 41 U/L Final    Total Cholesterol 04/18/2023 144  0 - 200 mg/dL Final    Triglycerides 04/18/2023 79  0 - 150 mg/dL Final    HDL Cholesterol 04/18/2023 42  40 - 60 mg/dL Final    VLDL Cholesterol He 04/18/2023 15  5 - 40 mg/dL Final    LDL Chol Calc (NIH) 04/18/2023 87  0 - 100 mg/dL Final    TSH 04/18/2023 0.990  0.270 - 4.200 uIU/mL Final    Free T4 04/18/2023 1.16  0.93 - 1.70 ng/dL Final       Diagnoses and all orders for this visit:    1. Chronic diarrhea  (Primary)    Other orders  -     nortriptyline (PAMELOR) 10 MG capsule; Take 2 capsules by mouth Every Night. Take 1 capsule at night for 1 week then 2 capsules nightly  Dispense: 60 capsule; Refill: 11      Pt with h/o IBS-d not resolved on xifaxan will begin tricyclic for IBS-d symptoms and f/u for response

## 2024-01-19 ENCOUNTER — PATIENT ROUNDING (BHMG ONLY) (OUTPATIENT)
Dept: INTERNAL MEDICINE | Age: 29
End: 2024-01-19
Payer: COMMERCIAL

## 2024-01-19 ENCOUNTER — OFFICE VISIT (OUTPATIENT)
Dept: INTERNAL MEDICINE | Age: 29
End: 2024-01-19
Payer: COMMERCIAL

## 2024-01-19 VITALS
SYSTOLIC BLOOD PRESSURE: 126 MMHG | WEIGHT: 181 LBS | HEART RATE: 98 BPM | DIASTOLIC BLOOD PRESSURE: 78 MMHG | BODY MASS INDEX: 26.81 KG/M2 | OXYGEN SATURATION: 98 % | TEMPERATURE: 97.6 F | HEIGHT: 69 IN

## 2024-01-19 DIAGNOSIS — Z11.59 NEED FOR HEPATITIS C SCREENING TEST: ICD-10-CM

## 2024-01-19 DIAGNOSIS — Z00.00 ROUTINE ADULT HEALTH MAINTENANCE: ICD-10-CM

## 2024-01-19 DIAGNOSIS — F41.1 GENERALIZED ANXIETY DISORDER: Primary | ICD-10-CM

## 2024-01-19 DIAGNOSIS — K64.9 HEMORRHOIDS, UNSPECIFIED HEMORRHOID TYPE: ICD-10-CM

## 2024-01-19 DIAGNOSIS — K58.0 IRRITABLE BOWEL SYNDROME WITH DIARRHEA: ICD-10-CM

## 2024-01-19 RX ORDER — NORTRIPTYLINE HYDROCHLORIDE 10 MG/1
20 CAPSULE ORAL NIGHTLY
Start: 2024-01-19

## 2024-01-19 RX ORDER — BUSPIRONE HYDROCHLORIDE 5 MG/1
5 TABLET ORAL 3 TIMES DAILY
Qty: 90 TABLET | Refills: 2
Start: 2024-01-19

## 2024-01-19 RX ORDER — HYDROCORTISONE ACETATE 25 MG/1
25 SUPPOSITORY RECTAL 2 TIMES DAILY
Qty: 100 EACH | Refills: 1 | Status: SHIPPED | OUTPATIENT
Start: 2024-01-19

## 2024-01-19 NOTE — PROGRESS NOTES
A [employer] message has been sent to the patient for PATIENT ROUNDING with [facility] – [place], [place].

## 2024-01-19 NOTE — PROGRESS NOTES
I N T E R N A L  M E D I C I N E  MAK [facility]OSS      ENCOUNTER DATE:  01/19/2024    Billy COTTO Brendan / 28 y.o. / male      CHIEF COMPLAINT / REASON FOR OFFICE VISIT     Establish Care, Anxiety, Irritable Bowel Syndrome, and Hemorrhoids      ASSESSMENT & PLAN     Problem List Items Addressed This Visit       Irritable bowel syndrome with diarrhea    Relevant Medications    nortriptyline (PAMELOR) 10 MG capsule     Other Visit Diagnoses       Generalized anxiety disorder    -  Primary    Relevant Medications    busPIRone (BUSPAR) 5 MG tablet    nortriptyline (PAMELOR) 10 MG capsule    Need for hepatitis C screening test        Relevant Orders    Hepatitis C antibody    Routine adult health maintenance        Relevant Orders    Comprehensive Metabolic Panel    CBC w AUTO Differential    Lipid Panel With / Chol / HDL Ratio    Urinalysis With Microscopic If Indicated (No Culture) - Urine, Clean Catch    TSH+Free T4    Hemoglobin A1c    Hemorrhoids, unspecified hemorrhoid type        Relevant Medications    hydrocortisone (ANUSOL-HC) 25 MG suppository          Orders Placed This Encounter   Procedures    Comprehensive Metabolic Panel    Lipid Panel With / Chol / HDL Ratio    Urinalysis With Microscopic If Indicated (No Culture) - Urine, Clean Catch    TSH+Free T4    Hemoglobin A1c    Hepatitis C antibody    CBC w AUTO Differential     New Medications Ordered This Visit   Medications    busPIRone (BUSPAR) 5 MG tablet     Sig: Take 1 tablet by mouth 3 (Three) Times a Day.     Dispense:  90 tablet     Refill:  2    nortriptyline (PAMELOR) 10 MG capsule     Sig: Take 2 capsules by mouth Every Night. Take 1 capsule at night for 1 week then 2 capsules nightly    hydrocortisone (ANUSOL-HC) 25 MG suppository     Sig: Insert 1 suppository into the rectum 2 (Two) Times a Day.     Dispense:  100 each     Refill:  1       SUMMARY/DISCUSSION  Side effects with Zoloft, no improvement with Wellbutrin, hydroxyzine just made him  "sleepy.  Will trial BuSpar for anxiety.  He will follow-up via [facility] if no improvement in symptoms  Will treat with hydrocortisone suppositories for hemorrhoids  Annual labs completed today.    Next Appointment with me: Visit date not found    Return in about 1 year (around 1/19/2025) for Annual physical.      VITAL SIGNS     Visit Vitals  /78   Pulse 98   Temp 97.6 °F (36.4 °C)   Ht 175.3 cm (69.02\")   Wt 82.1 kg (181 lb)   SpO2 98%   BMI 26.72 kg/m²     Wt Readings from Last 3 Encounters:   01/19/24 82.1 kg (181 lb)   07/13/23 79.6 kg (175 lb 6.4 oz)   04/18/23 80.6 kg (177 lb 9.6 oz)     Body mass index is 26.72 kg/m².    MEDICATIONS AT THE TIME OF OFFICE VISIT     Current Outpatient Medications on File Prior to Visit   Medication Sig    calcium carbonate (TUMS) 500 MG chewable tablet Chew 1 tablet As Needed for Indigestion or Heartburn.    [DISCONTINUED] nortriptyline (PAMELOR) 10 MG capsule Take 2 capsules by mouth Every Night. Take 1 capsule at night for 1 week then 2 capsules nightly    [DISCONTINUED] hydrOXYzine (ATARAX) 25 MG tablet Take 1 tablet by mouth 3 (Three) Times a Day As Needed for Itching.    [DISCONTINUED] loperamide (IMODIUM) 2 MG capsule Take 1 capsule by mouth 4 (Four) Times a Day As Needed for Diarrhea.     No current facility-administered medications on file prior to visit.      HISTORY OF PRESENT ILLNESS     Patient presents to establish care with new provider in office.    Pelvic gastroenterology, Dr. Boyer in 2019 for colonoscopy with recall recommended at age 45 for routine screening.  Herbal bowel syndrome with diarrhea overall stable with amitriptyline 10 mg daily but does cause intermittent constipation leading to current hemorrhoids.  Has incorporated fiber in his diet with significant improvement in constipation but remaining hemorrhoids    Generalized anxiety long-term.  Failed Zoloft.  Was on Wellbutrin only for short time when he had bowel issues and for the medication.  " "Hydroxyzine made him sleepy.  No recent panic attack.    History of hypertension but blood pressure is well-controlled today.    REVIEW OF SYSTEMS     Constitutional neg except per HPI   Resp neg  CV neg   GI diarrhea stable, hemorrhoids  Psych anxious     PHYSICAL EXAMINATION     Physical Exam  Constitutional  No distress  Cardiovascular Rate  normal . Rhythm: regular . Heart sounds:  normal  Pulmonary/Chest  Effort normal. Breath sounds:  normal  Psychiatric  Alert. Judgment and thought content normal. Mood normal      REVIEWED DATA     Labs:   Lab Results   Component Value Date    GLUCOSE 97 04/18/2023    BUN 11 04/18/2023    CREATININE 1.09 04/18/2023    EGFRRESULT 95.4 04/18/2023    BCR 10.1 04/18/2023    K 4.4 04/18/2023    CO2 27.9 04/18/2023    CALCIUM 9.8 04/18/2023    PROTENTOTREF 6.6 04/18/2023    ALBUMIN 4.6 04/18/2023    BILITOT 1.4 (H) 04/18/2023    AST 18 04/18/2023    ALT 41 04/18/2023     Lab Results   Component Value Date    WBC 5.86 04/18/2023    HGB 17.1 04/18/2023    HCT 49.4 04/18/2023    MCV 90.0 04/18/2023     04/18/2023     Lab Results   Component Value Date    CHLPL 144 04/18/2023    TRIG 79 04/18/2023    HDL 42 04/18/2023    LDL 87 04/18/2023     No results found for: \"HGBA1C\"  Lab Results   Component Value Date    TSH 0.990 04/18/2023     Brief Urine Lab Results       None          Imaging:           Medical Tests:           Summary of old records / correspondence / consultant report:           Request outside records:             *Dragon dictation used for documentation.   "

## 2024-01-20 LAB
ALBUMIN SERPL-MCNC: 5 G/DL (ref 3.5–5.2)
ALBUMIN/GLOB SERPL: 2.8 G/DL
ALP SERPL-CCNC: 65 U/L (ref 39–117)
ALT SERPL-CCNC: 53 U/L (ref 1–41)
APPEARANCE UR: CLEAR
AST SERPL-CCNC: 26 U/L (ref 1–40)
BASOPHILS # BLD AUTO: 0.03 10*3/MM3 (ref 0–0.2)
BASOPHILS NFR BLD AUTO: 0.5 % (ref 0–1.5)
BILIRUB SERPL-MCNC: 1.1 MG/DL (ref 0–1.2)
BILIRUB UR QL STRIP: NEGATIVE
BUN SERPL-MCNC: 13 MG/DL (ref 6–20)
BUN/CREAT SERPL: 12.1 (ref 7–25)
CALCIUM SERPL-MCNC: 9.5 MG/DL (ref 8.6–10.5)
CHLORIDE SERPL-SCNC: 102 MMOL/L (ref 98–107)
CHOLEST SERPL-MCNC: 144 MG/DL (ref 0–200)
CHOLEST/HDLC SERPL: 3.35 {RATIO}
CO2 SERPL-SCNC: 25.3 MMOL/L (ref 22–29)
COLOR UR: YELLOW
CREAT SERPL-MCNC: 1.07 MG/DL (ref 0.76–1.27)
EGFRCR SERPLBLD CKD-EPI 2021: 96.9 ML/MIN/1.73
EOSINOPHIL # BLD AUTO: 0.02 10*3/MM3 (ref 0–0.4)
EOSINOPHIL NFR BLD AUTO: 0.3 % (ref 0.3–6.2)
ERYTHROCYTE [DISTWIDTH] IN BLOOD BY AUTOMATED COUNT: 12.4 % (ref 12.3–15.4)
GLOBULIN SER CALC-MCNC: 1.8 GM/DL
GLUCOSE SERPL-MCNC: 89 MG/DL (ref 65–99)
GLUCOSE UR QL STRIP: NEGATIVE
HBA1C MFR BLD: 5.5 % (ref 4.8–5.6)
HCT VFR BLD AUTO: 52.7 % (ref 37.5–51)
HCV IGG SERPL QL IA: NON REACTIVE
HDLC SERPL-MCNC: 43 MG/DL (ref 40–60)
HGB BLD-MCNC: 18.4 G/DL (ref 13–17.7)
HGB UR QL STRIP: NEGATIVE
IMM GRANULOCYTES # BLD AUTO: 0.02 10*3/MM3 (ref 0–0.05)
IMM GRANULOCYTES NFR BLD AUTO: 0.3 % (ref 0–0.5)
KETONES UR QL STRIP: NEGATIVE
LDLC SERPL CALC-MCNC: 84 MG/DL (ref 0–100)
LEUKOCYTE ESTERASE UR QL STRIP: NEGATIVE
LYMPHOCYTES # BLD AUTO: 2.01 10*3/MM3 (ref 0.7–3.1)
LYMPHOCYTES NFR BLD AUTO: 32.2 % (ref 19.6–45.3)
MCH RBC QN AUTO: 31 PG (ref 26.6–33)
MCHC RBC AUTO-ENTMCNC: 34.9 G/DL (ref 31.5–35.7)
MCV RBC AUTO: 88.9 FL (ref 79–97)
MONOCYTES # BLD AUTO: 0.63 10*3/MM3 (ref 0.1–0.9)
MONOCYTES NFR BLD AUTO: 10.1 % (ref 5–12)
NEUTROPHILS # BLD AUTO: 3.54 10*3/MM3 (ref 1.7–7)
NEUTROPHILS NFR BLD AUTO: 56.6 % (ref 42.7–76)
NITRITE UR QL STRIP: NEGATIVE
NRBC BLD AUTO-RTO: 0 /100 WBC (ref 0–0.2)
PH UR STRIP: 6 [PH] (ref 5–8)
PLATELET # BLD AUTO: 273 10*3/MM3 (ref 140–450)
POTASSIUM SERPL-SCNC: 4.5 MMOL/L (ref 3.5–5.2)
PROT SERPL-MCNC: 6.8 G/DL (ref 6–8.5)
PROT UR QL STRIP: NEGATIVE
RBC # BLD AUTO: 5.93 10*6/MM3 (ref 4.14–5.8)
SODIUM SERPL-SCNC: 138 MMOL/L (ref 136–145)
SP GR UR STRIP: 1.02 (ref 1–1.03)
T4 FREE SERPL-MCNC: 1.25 NG/DL (ref 0.93–1.7)
TRIGL SERPL-MCNC: 87 MG/DL (ref 0–150)
TSH SERPL DL<=0.005 MIU/L-ACNC: 1.09 UIU/ML (ref 0.27–4.2)
UROBILINOGEN UR STRIP-MCNC: NORMAL MG/DL
VLDLC SERPL CALC-MCNC: 17 MG/DL (ref 5–40)
WBC # BLD AUTO: 6.25 10*3/MM3 (ref 3.4–10.8)

## 2024-01-21 DIAGNOSIS — R74.01 ELEVATED ALT MEASUREMENT: Primary | ICD-10-CM

## 2024-01-21 DIAGNOSIS — D58.2 ELEVATED HEMOGLOBIN: ICD-10-CM

## 2024-01-22 ENCOUNTER — TELEPHONE (OUTPATIENT)
Dept: INTERNAL MEDICINE | Age: 29
End: 2024-01-22

## 2024-01-22 RX ORDER — NORTRIPTYLINE HYDROCHLORIDE 10 MG/1
CAPSULE ORAL
Qty: 60 CAPSULE | Refills: 11 | Status: SHIPPED | OUTPATIENT
Start: 2024-01-22

## 2024-01-22 NOTE — TELEPHONE ENCOUNTER
Hub staff attempted to follow warm transfer process and was unsuccessful     Caller: Billy Cardona    Relationship to patient: Self    Best call back number: [phone]    Patient is needing: LAB RECHECK, PER GIFT[facility] LAWS

## 2024-01-23 DIAGNOSIS — F41.1 GENERALIZED ANXIETY DISORDER: ICD-10-CM

## 2024-01-23 RX ORDER — BUSPIRONE HYDROCHLORIDE 5 MG/1
5 TABLET ORAL 3 TIMES DAILY
Qty: 90 TABLET | Refills: 1 | Status: SHIPPED | OUTPATIENT
Start: 2024-01-23

## 2024-01-23 RX ORDER — BUSPIRONE HYDROCHLORIDE 5 MG/1
5 TABLET ORAL 3 TIMES DAILY
Qty: 90 TABLET | Refills: 1 | Status: SHIPPED | OUTPATIENT
Start: 2024-01-23 | End: 2024-01-23

## 2024-02-26 DIAGNOSIS — F41.1 GENERALIZED ANXIETY DISORDER: Primary | ICD-10-CM

## 2024-02-26 RX ORDER — BUSPIRONE HYDROCHLORIDE 10 MG/1
10 TABLET ORAL 3 TIMES DAILY
Qty: 270 TABLET | Refills: 1 | Status: SHIPPED | OUTPATIENT
Start: 2024-02-26

## 2024-02-27 DIAGNOSIS — R79.89 ELEVATED FERRITIN: Primary | ICD-10-CM

## 2024-02-27 DIAGNOSIS — R74.8 ELEVATED LIVER ENZYMES: ICD-10-CM

## 2024-02-28 DIAGNOSIS — D58.2 ELEVATED HEMOGLOBIN: Primary | ICD-10-CM

## 2024-02-28 DIAGNOSIS — R74.8 ELEVATED LIVER ENZYMES: ICD-10-CM

## 2024-03-31 NOTE — TELEPHONE ENCOUNTER
Caller: BrendanBilly    Relationship: Self    Best call back number: [phone]   Medication needed:   Requested Prescriptions     Pending Prescriptions Disp Refills   • amLODIPine (NORVASC) 5 MG tablet 30 tablet 1     Sig: Take 1 tablet by mouth Daily.       When do you need the refill by: ASAP    What additional details did the patient provide when requesting the medication: CONFIRM MEDICATION IS FOR BLOOD PRESSURE     Does the patient have less than a 3 day supply:  [x] Yes  [] No    What is the patient's preferred pharmacy: [facility] [address] - [phone]  - [phone] FX            The patient is a 32y Male complaining of cp

## 2024-04-05 ENCOUNTER — TELEPHONE (OUTPATIENT)
Dept: INTERNAL MEDICINE | Age: 29
End: 2024-04-05

## 2024-08-05 DIAGNOSIS — K58.0 IRRITABLE BOWEL SYNDROME WITH DIARRHEA: ICD-10-CM

## 2024-08-05 RX ORDER — NORTRIPTYLINE HYDROCHLORIDE 10 MG/1
20 CAPSULE ORAL NIGHTLY
Start: 2024-08-05 | End: 2024-08-06 | Stop reason: SDUPTHER

## 2024-08-05 RX ORDER — NORTRIPTYLINE HYDROCHLORIDE 10 MG/1
CAPSULE ORAL
Qty: 60 CAPSULE | Refills: 11 | OUTPATIENT
Start: 2024-08-05

## 2024-08-06 DIAGNOSIS — K58.0 IRRITABLE BOWEL SYNDROME WITH DIARRHEA: ICD-10-CM

## 2024-08-06 RX ORDER — NORTRIPTYLINE HYDROCHLORIDE 10 MG/1
20 CAPSULE ORAL NIGHTLY
Status: CANCELLED
Start: 2024-08-06

## 2024-08-06 RX ORDER — NORTRIPTYLINE HYDROCHLORIDE 10 MG/1
20 CAPSULE ORAL NIGHTLY
Start: 2024-08-06 | End: 2024-08-09 | Stop reason: SDUPTHER

## 2024-08-06 NOTE — TELEPHONE ENCOUNTER
Caller: Billy Cardona    Relationship: Self    Best call back number: [phone]     Requested Prescriptions:   Requested Prescriptions     Pending Prescriptions Disp Refills    nortriptyline (PAMELOR) 10 MG capsule       Sig: Take 2 capsules by mouth Every Night. Take 1 capsule at night for 1 week then 2 capsules nightly        Pharmacy where request should be sent:  [facility]    Last office visit with prescribing clinician: 7/13/2023   Last telemedicine visit with prescribing clinician: Visit date not found   Next office visit with prescribing clinician: Visit date not found     Additional details provided by patient: N/A    Does the patient have less than a 3 day supply:  [x] Yes  [] No    Would you like a call back once the refill request has been completed: [] Yes [x] No    If the office needs to give you a call back, can they leave a voicemail: [] Yes [x] No    Merritt Aparicio Rep   08/06/24 12:58 EDT

## 2024-08-09 ENCOUNTER — TELEPHONE (OUTPATIENT)
Dept: GASTROENTEROLOGY | Facility: CLINIC | Age: 29
End: 2024-08-09
Payer: COMMERCIAL

## 2024-08-09 DIAGNOSIS — K58.0 IRRITABLE BOWEL SYNDROME WITH DIARRHEA: ICD-10-CM

## 2024-08-09 RX ORDER — NORTRIPTYLINE HYDROCHLORIDE 10 MG/1
20 CAPSULE ORAL NIGHTLY
Qty: 60 CAPSULE | Refills: 0
Start: 2024-08-09

## 2024-08-09 NOTE — TELEPHONE ENCOUNTER
DELETE AFTER REVIEWING: Send this encounter to the appropriate pool. See your Call Action Grid or Workflows for direction.        Hub staff attempted to follow warm transfer process and was unsuccessful     Caller: Billy Cardona A    Relationship to patient: Self    Best call back number: [phone]     Patient is needing: PATIENT REQUESTED A REFILL FOR HIS nortriptyline (PAMELOR) 10 MG capsule BUT NEEDED A YEARLY FOLLOW UP SCHEDULED.  IT IS SCHEDULED FOR 8/30/24.  PATIENT IS COMPLETELY OUT OF HIS MEDICATION SO NEEDS THIS REFILLED AS SOON AS POSSIBLE.  PLEASE CALL PATIENT IF THERE ANY ISSUES.

## 2024-08-10 DIAGNOSIS — K58.0 IRRITABLE BOWEL SYNDROME WITH DIARRHEA: ICD-10-CM

## 2024-08-15 ENCOUNTER — TELEPHONE (OUTPATIENT)
Dept: INTERNAL MEDICINE | Age: 29
End: 2024-08-15
Payer: COMMERCIAL

## 2024-08-20 RX ORDER — NORTRIPTYLINE HYDROCHLORIDE 10 MG/1
20 CAPSULE ORAL NIGHTLY
Qty: 180 CAPSULE | Refills: 2 | Status: SHIPPED | OUTPATIENT
Start: 2024-08-20

## 2024-08-30 ENCOUNTER — OFFICE VISIT (OUTPATIENT)
Dept: GASTROENTEROLOGY | Facility: CLINIC | Age: 29
End: 2024-08-30
Payer: COMMERCIAL

## 2024-08-30 VITALS
TEMPERATURE: 98.4 F | HEIGHT: 71 IN | SYSTOLIC BLOOD PRESSURE: 138 MMHG | WEIGHT: 186.2 LBS | HEART RATE: 98 BPM | DIASTOLIC BLOOD PRESSURE: 89 MMHG | BODY MASS INDEX: 26.07 KG/M2

## 2024-08-30 DIAGNOSIS — K62.5 RECTAL BLEEDING: ICD-10-CM

## 2024-08-30 DIAGNOSIS — K58.0 IRRITABLE BOWEL SYNDROME WITH DIARRHEA: ICD-10-CM

## 2024-08-30 DIAGNOSIS — K62.89 ANAL OR RECTAL PAIN: ICD-10-CM

## 2024-08-30 DIAGNOSIS — K64.8 INTERNAL HEMORRHOIDS: Primary | ICD-10-CM

## 2024-08-30 PROCEDURE — 99214 OFFICE O/P EST MOD 30 MIN: CPT | Performed by: NURSE PRACTITIONER

## 2024-08-30 NOTE — PROGRESS NOTES
Chief Complaint   Patient presents with    Irritable Bowel Syndrome       HPI    Billy Cardona is a  28 y.o. male here for a follow up visit for irritable bowel syndrome with diarrhea.    Past medical history of anxiety and hypertension.     Seen today for routine follow-up of irritable bowel syndrome with adequate control of symptoms on nightly Pamelor.  He does mention issues with hemorrhoids starting roughly 6 months ago with exam and feelings of a protruding hemorrhoid that can be uncomfortable and cause rectal bleeding with bright red blood on the toilet paper.  He also feels like he passed some blood clots lately.  Denies diarrhea, constipation or abdominal pain.  His appetite is good.  His weight is stable.  He has been treated with topical agents in the form of Anusol with no change in symptoms.    Recent CBC normal.    C-scope 2019 (reviewed) - Nonbleeding internal hemorrhoids otherwise normal.  Recall age 50.    Past Medical History:   Diagnosis Date    Alternating constipation and diarrhea     Anxiety     Blood in stool     Hypertension     Irritable bowel syndrome        Past Surgical History:   Procedure Laterality Date    COLONOSCOPY      NO PAST SURGERIES      SIGMOIDOSCOPY N/A 10/30/2019    Procedure: FLEXIBLE SIGMOIDOSCOPY into cecum and terminal ileum with biopsies;  Surgeon: Mohit Boyer MD;  Location: [facility] ENDOSCOPY;  Service: Gastroenterology       Scheduled Meds:     Continuous Infusions: No current facility-administered medications for this visit.      PRN Meds:     No Known Allergies    Social History     Socioeconomic History    Marital status:    Tobacco Use    Smoking status: Never    Smokeless tobacco: Current     Types: Chew    Tobacco comments:     CHEWS TOBACCO   Vaping Use    Vaping status: Never Used   Substance and Sexual Activity    Alcohol use: Yes     Alcohol/week: 4.0 standard drinks of alcohol     Types: 4 Cans of beer per week     Comment: OCCATIONALLY     Drug use: Never    Sexual activity: Yes     Partners: Male       Family History   Problem Relation Age of Onset    MIKEL disease Father     Thyroid disease Father     Hyperlipidemia Mother     Malig Hyperthermia Neg Hx        Review of Systems   Gastrointestinal:  Positive for rectal pain.       Vitals:    08/30/24 1436   BP: 138/89   Pulse: 98   Temp: 98.4 °F (36.9 °C)       Physical Exam  Constitutional:       Appearance: He is well-developed.   Abdominal:      General: Bowel sounds are normal. There is no distension.      Palpations: Abdomen is soft. There is no mass.      Tenderness: There is no abdominal tenderness. There is no guarding.      Hernia: No hernia is present.   Genitourinary:     Rectum: Tenderness present. No anal fissure.      Comments: Rectal examination suspicious for small thrombosed internal hemorrhoid  Skin:     General: Skin is warm and dry.      Capillary Refill: Capillary refill takes less than 2 seconds.   Neurological:      Mental Status: He is alert and oriented to person, place, and time.   Psychiatric:         Behavior: Behavior normal.       Assessment    Diagnoses and all orders for this visit:    1. Internal hemorrhoids (Primary)  -     Ambulatory Referral to Colorectal Surgery    2. Anal or rectal pain  -     Ambulatory Referral to Colorectal Surgery    3. Rectal bleeding  -     Ambulatory Referral to Colorectal Surgery    4. Irritable bowel syndrome with diarrhea       Plan    Patient complaining of rectal discomfort with rectal bleeding and findings of possible small thrombosed internal hemorrhoid on rectal examination.  Symptoms have not improved with topical agents.  As such we will refer to Dr. Alicea for further evaluation of symptoms.  Continue nightly Pamelor for IBS-D.  High-fiber diet.  Avoid prolonged sitting.  Further recommendations pending assessment by colorectal surgery.         MAK Kearney  [facility]  [address] [place] [address]  [address]  [place] [address]  Office: [phone]

## 2024-10-14 ENCOUNTER — OFFICE VISIT (OUTPATIENT)
Dept: SURGERY | Facility: CLINIC | Age: 29
End: 2024-10-14
Payer: COMMERCIAL

## 2024-10-14 VITALS
WEIGHT: 188.6 LBS | DIASTOLIC BLOOD PRESSURE: 90 MMHG | SYSTOLIC BLOOD PRESSURE: 136 MMHG | HEIGHT: 71 IN | BODY MASS INDEX: 26.4 KG/M2

## 2024-10-14 DIAGNOSIS — K64.8 BLEEDING INTERNAL HEMORRHOIDS: Primary | ICD-10-CM

## 2024-10-14 NOTE — H&P (VIEW-ONLY)
Colorectal & General Surgery  Consultation    Patient: Billy Cardona  YOB: 1995  MRN: [id number]      Assessment  Billy Cardona is a 28 y.o. male with longstanding bleeding internal hemorrhoids refractory to medical management.  We discussed the role of excisional hemorrhoidectomy after performing anoscopy in the office, which demonstrated grade 2 internal hemorrhoids and at least 2 polyps.  We discussed the risk, benefits, alternatives to excisional hemorrhoidectomy, including the risk of postoperative pain and fecal incontinence.  Informed consent was obtained.  Plan to proceed to the operating room when convenient with his work schedule.    Referring Provider: MAK Kearney     Reason for Consultation: Hemorrhoids, rectal bleeding    History of Present Illness   Billy Cardona is a 28 y.o. male who presents to the office with longstanding hematochezia secondary to hemorrhoids.  He describes significant hematochezia with each bowel movement.  He has tried a myriad of topical and medical therapy with no significant benefit.    Most recent colonoscopy: 2019, normal    Past Medical History   Past Medical History:   Diagnosis Date    Alternating constipation and diarrhea     Anxiety     Blood in stool     Hypertension     Irritable bowel syndrome         Past Surgical History   Past Surgical History:   Procedure Laterality Date    COLONOSCOPY      SIGMOIDOSCOPY N/A 10/30/2019    Procedure: FLEXIBLE SIGMOIDOSCOPY into cecum and terminal ileum with biopsies;  Surgeon: Mohit Boyer MD;  Location: [facility] ENDOSCOPY;  Service: Gastroenterology       Social History  Social History     Socioeconomic History    Marital status:    Tobacco Use    Smoking status: Never    Smokeless tobacco: Current     Types: Chew    Tobacco comments:     CHEWS TOBACCO   Vaping Use    Vaping status: Never Used   Substance and Sexual Activity    Alcohol use: Yes     Alcohol/week: 4.0 standard drinks of alcohol      Types: 4 Cans of beer per week     Comment: OCCATIONALLY    Drug use: Never    Sexual activity: Yes     Partners: Male       Family History  Family History   Problem Relation Age of Onset    MIKEL disease Father     Thyroid disease Father     Hyperlipidemia Mother     Malig Hyperthermia Neg Hx        Colorectal cancer family history: None    Review of Systems  Negative except as documented in the HPI.     Allergies  No Known Allergies    Medications    Current Outpatient Medications:     nortriptyline (PAMELOR) 10 MG capsule, Take 2 capsules by mouth Every Night., Disp: 180 capsule, Rfl: 2    Vital Signs  Vitals:    10/14/24 1356   BP: 136/90        Physical Exam  Constitutional: Resting comfortably, no acute distress  Neck: Supple, trachea midline  Respiratory: No increased work of breathing, Symmetric excursion  Cardiovascular: Well pefursed, no jugular venous distention evident   Abdominal:  Soft, non-tender, non-distended  Lymphatics: No cervical or suprascapular adenopathy  Skin: Warm, dry, no rash on visualized skin surfaces  Musculoskeletal: Symmetric strength, no obvious gross abnormalities  Psychiatric: Alert and oriented ×3, normal affect   .  Anal: Normal external exam  Digital rectal exam: Normal tone, no masses    DIAGNOSTIC ANOSCOPY  Indication: Internal hemorrhoids, hematochezia risks, benefits and alternatives were discussed and the patient agreed to the procedure.      Procedure Note: With a lubricated, gloved index finger, I gently performed a 360 degree sweep of the rectum checking for anal sphincter tone, tenderness, nodules, and masses. Following gentle dilation with a digital rectal exam, I inserted the lubricated anoscope with the obturator completely inserted. The obturator was removed after fully inserting the anoscope. The anoscope was slowly withdrawn, and the rectal and anal mucosa examined over 360 degrees.     Findings: At least 2 columns of grade 2 internal hemorrhoids that appear  inflamed and relatively large.  Complications: None  Patient tolerated the procedure well.      Laboratory Results  I have personally reviewed CBC with WBC 6, hemoglobin 17, platelets 231.  CMP with creatinine 1.0, albumin 4.5.    Radiology  None to review    Endoscopy  I have personally reviewed colonoscopy report from 2019 demonstrates a normal colon with internal hemorrhoids.         Serafin Alicea MD  Colorectal & General Surgery  [facility]    [address], [address]  [place], [place], [address]  P: [phone]  F: [phone]

## 2024-10-14 NOTE — PROGRESS NOTES
Colorectal & General Surgery  Consultation    Patient: Billy Cardona  YOB: 1995  MRN: [id number]      Assessment  Billy Cardona is a 28 y.o. male with longstanding bleeding internal hemorrhoids refractory to medical management.  We discussed the role of excisional hemorrhoidectomy after performing anoscopy in the office, which demonstrated grade 2 internal hemorrhoids and at least 2 polyps.  We discussed the risk, benefits, alternatives to excisional hemorrhoidectomy, including the risk of postoperative pain and fecal incontinence.  Informed consent was obtained.  Plan to proceed to the operating room when convenient with his work schedule.    Referring Provider: MAK Kearney     Reason for Consultation: Hemorrhoids, rectal bleeding    History of Present Illness   Billy Cardona is a 28 y.o. male who presents to the office with longstanding hematochezia secondary to hemorrhoids.  He describes significant hematochezia with each bowel movement.  He has tried a myriad of topical and medical therapy with no significant benefit.    Most recent colonoscopy: 2019, normal    Past Medical History   Past Medical History:   Diagnosis Date    Alternating constipation and diarrhea     Anxiety     Blood in stool     Hypertension     Irritable bowel syndrome         Past Surgical History   Past Surgical History:   Procedure Laterality Date    COLONOSCOPY      SIGMOIDOSCOPY N/A 10/30/2019    Procedure: FLEXIBLE SIGMOIDOSCOPY into cecum and terminal ileum with biopsies;  Surgeon: Mohit Boyer MD;  Location: [facility] ENDOSCOPY;  Service: Gastroenterology       Social History  Social History     Socioeconomic History    Marital status:    Tobacco Use    Smoking status: Never    Smokeless tobacco: Current     Types: Chew    Tobacco comments:     CHEWS TOBACCO   Vaping Use    Vaping status: Never Used   Substance and Sexual Activity    Alcohol use: Yes     Alcohol/week: 4.0 standard drinks of alcohol      Types: 4 Cans of beer per week     Comment: OCCATIONALLY    Drug use: Never    Sexual activity: Yes     Partners: Male       Family History  Family History   Problem Relation Age of Onset    MIKEL disease Father     Thyroid disease Father     Hyperlipidemia Mother     Malig Hyperthermia Neg Hx        Colorectal cancer family history: None    Review of Systems  Negative except as documented in the HPI.     Allergies  No Known Allergies    Medications    Current Outpatient Medications:     nortriptyline (PAMELOR) 10 MG capsule, Take 2 capsules by mouth Every Night., Disp: 180 capsule, Rfl: 2    Vital Signs  Vitals:    10/14/24 1356   BP: 136/90        Physical Exam  Constitutional: Resting comfortably, no acute distress  Neck: Supple, trachea midline  Respiratory: No increased work of breathing, Symmetric excursion  Cardiovascular: Well pefursed, no jugular venous distention evident   Abdominal:  Soft, non-tender, non-distended  Lymphatics: No cervical or suprascapular adenopathy  Skin: Warm, dry, no rash on visualized skin surfaces  Musculoskeletal: Symmetric strength, no obvious gross abnormalities  Psychiatric: Alert and oriented ×3, normal affect   .  Anal: Normal external exam  Digital rectal exam: Normal tone, no masses    DIAGNOSTIC ANOSCOPY  Indication: Internal hemorrhoids, hematochezia risks, benefits and alternatives were discussed and the patient agreed to the procedure.      Procedure Note: With a lubricated, gloved index finger, I gently performed a 360 degree sweep of the rectum checking for anal sphincter tone, tenderness, nodules, and masses. Following gentle dilation with a digital rectal exam, I inserted the lubricated anoscope with the obturator completely inserted. The obturator was removed after fully inserting the anoscope. The anoscope was slowly withdrawn, and the rectal and anal mucosa examined over 360 degrees.     Findings: At least 2 columns of grade 2 internal hemorrhoids that appear  inflamed and relatively large.  Complications: None  Patient tolerated the procedure well.      Laboratory Results  I have personally reviewed CBC with WBC 6, hemoglobin 17, platelets 231.  CMP with creatinine 1.0, albumin 4.5.    Radiology  None to review    Endoscopy  I have personally reviewed colonoscopy report from 2019 demonstrates a normal colon with internal hemorrhoids.         Serafin Alicea MD  Colorectal & General Surgery  [facility]    [address], [address]  [place], [place], [address]  P: [phone]  F: [phone]

## 2024-10-18 ENCOUNTER — PATIENT ROUNDING (BHMG ONLY) (OUTPATIENT)
Dept: SURGERY | Facility: CLINIC | Age: 29
End: 2024-10-18
Payer: COMMERCIAL

## 2024-10-18 NOTE — PROGRESS NOTES
October 18, 2024    I am calling to officially welcome you to our practice and ask about your recent visit. Is this a good time to talk? No. Left voicemail to call back.

## 2024-11-01 NOTE — DISCHARGE INSTRUCTIONS
Dr. Serafin Alicea  [address] [facility] [address]  [place] [id number]  [phone]    Discharge Instructions for Anorectal Procedure    Go home, rest and take it easy today; however, you should get up and move about several times today to reduce the risk of developing a clot in your legs.      You may experience some dizziness or memory loss from the anesthesia.  This may last for the next 24 hours.  Someone should plan on staying with you for the first 24 hours for your safety.    Do not make any important legal decisions or sign any legal papers for the next 24 hours.      Eat and drink lightly today.  Start off with liquids, jello, soup, crackers or other bland foods at first. Please drink plenty of fluids. You may advance your diet tomorrow as tolerated as long as you do not experience any nausea or vomiting.     You should apply ice packs to the anal area today and begin your sitz baths tomorrow.    The best method of pain relief is a sitz bath (sitting in a tub or warm water) at least 3 times daily for 15 minutes.  This helps to reduce pain and aids with hygiene/drainage.  The drainage may have an unpleasant odor.  This is not unexpected and should be controlled with baths and showers.      If you have packing, remove the packing tomorrow.  You do not have to replace the packing once removed.  It will be critical to keep the area clean so take a sitz bath or a warm shower and allow soapy water to run over the perianal area after each bowel movement.    Bleeding and drainage are to be expected and may persist for as long as 2-4 weeks. Bleeding may occur with your bowel movements as well. Wear a cotton liner such as a Kotex pad or panty liner inside your underwear to protect your clothing.      Pain Medications  Alternate taking tylenol and ibuprofen at the doses below. If you have been instructed to not take either of those medicines due to another medical issue, please do not take them.  Tylenol 650 mg every 6  hours as needed for pain  Ibuprofen 600 mg every 6 hours as needed for pain  You have prescription strength pain medications available for you if you experience severe pain. Do not take the oxycodone and diazepam (Valium) simultaneously. Space them out by at least one hour.   Oxycodone 5 mg tablets. Take 1 to 2 tablets every 4 to 6 hours as needed for severe pain.   Diazepam (Valium) 5 mg tablets. Take 1 tablet every 8 hours as needed for severe anal pain or anal spasms. Do NOT take simultaneously with oxycodone.   You will not be totally pain free, but your pain medicine should make the pain tolerable.  Please take your pain medicine as prescribed and always take your pills with food to prevent nausea.  If you are having severe pain that cannot be controlled by the pain medicine, please contact me.    Remember that warm sitz baths are often very effective in controlling pain as well.    The goal is for your bowel movements to be soft which will help to minimize pain.  The pain medicine used to keep your comfortable may also cause some constipation so I recommend the following:    Metamucil powder 1 tablespoon in 8oz of water twice daily  Miralax daily as needed to produce one daily bowel movement   Contact me if the above does not result in soft bowel movements as you may need to add to the regimen.      No driving for 24 hours and for as long as you are taking your prescription pain medicine.  You may resume your activities gradually after 2 weeks. No heavy lifting (> 10 pounds) for 2 weeks.     You may return to work on the second day after surgery as you are able to tolerate.       The office will schedule you a follow up appointment. If you do not have one or do not hear from the office after one week, please call to schedule.     Remember to contact me for any of the following:    Fever > 101 degrees  Severe pain that cannot be controlled by taking your pain pills  Severe nausea or vomiting that cannot be  controlled by taking your nausea pills  Significant bleeding   Inability to urinate  Any other questions or concerns

## 2024-11-01 NOTE — PAT
PAT call complete. Education provided to the patient on the following:    - Nothing to eat after midnight the night before your procedure, water and black coffee okay up to 2 hours before arrival time.  - If diabetic and procedure is after noon: No food 8 hours prior to arrival time, and only then only clear liquids 2 hours before arrival time.   - You will need to have someone drive you home after your procedure and remain with you for 24 hours after. The  will need to remain on site during your visit.  - Please remove all jewelry, including body piercing's, and leave any valuables at home. Only bring your drivers license and insurance card on day of procedure.  - Do not wear contact lenses; wear glasses and bring your case.  - Do not use any tobacco products on morning of procedure.  - Wash with antibacterial soap (such as Dial) the night before and morning of procedure.  - Be prepared to provide your last dose of all home medications.  - Coffee and vending available on the 1st and 5th floors; no cafeteria on site.  - You will need to arrive at 0700 on 11/8/24 at [facility] located at [address] [facility]. We are located on the 5th floor.  -Please be aware that arrival times may be subject to change up until the day of surgery.   - Feel free to contact us at: [phone] with any additional questions/concerns.  Patient has no medications to hold, patient verbalized understanding.  MARLYN Cuello RN

## 2024-11-08 ENCOUNTER — ANESTHESIA (OUTPATIENT)
Age: 29
End: 2024-11-08
Payer: COMMERCIAL

## 2024-11-08 ENCOUNTER — ANESTHESIA EVENT (OUTPATIENT)
Age: 29
End: 2024-11-08
Payer: COMMERCIAL

## 2024-11-08 ENCOUNTER — HOSPITAL ENCOUNTER (OUTPATIENT)
Age: 29
Setting detail: HOSPITAL OUTPATIENT SURGERY
Discharge: HOME | End: 2024-11-08
Attending: SURGERY | Admitting: SURGERY
Payer: COMMERCIAL

## 2024-11-08 VITALS
HEART RATE: 88 BPM | WEIGHT: 183.2 LBS | RESPIRATION RATE: 18 BRPM | SYSTOLIC BLOOD PRESSURE: 113 MMHG | HEIGHT: 69 IN | BODY MASS INDEX: 27.13 KG/M2 | TEMPERATURE: 98.1 F | OXYGEN SATURATION: 98 % | DIASTOLIC BLOOD PRESSURE: 83 MMHG

## 2024-11-08 DIAGNOSIS — K64.8 BLEEDING INTERNAL HEMORRHOIDS: Primary | ICD-10-CM

## 2024-11-08 PROCEDURE — 0 BUPIVACAINE LIPOSOME 1.3 % SUSPENSION 20 ML VIAL: Performed by: SURGERY

## 2024-11-08 PROCEDURE — 25010000002 PROPOFOL 10 MG/ML EMULSION: Performed by: ANESTHESIOLOGY

## 2024-11-08 PROCEDURE — 88304 TISSUE EXAM BY PATHOLOGIST: CPT | Performed by: SURGERY

## 2024-11-08 PROCEDURE — 46260 REMOVE IN/EX HEM GROUPS 2+: CPT | Performed by: SURGERY

## 2024-11-08 PROCEDURE — 25810000003 LACTATED RINGERS PER 1000 ML: Performed by: ANESTHESIOLOGY

## 2024-11-08 PROCEDURE — 25010000002 LIDOCAINE 2% SOLUTION: Performed by: ANESTHESIOLOGY

## 2024-11-08 PROCEDURE — C9290 INJ, BUPIVACAINE LIPOSOME: HCPCS | Performed by: SURGERY

## 2024-11-08 DEVICE — HEMOST ABS SURGIFOAM SZ100 8X12 10MM: Type: IMPLANTABLE DEVICE | Site: PERIANAL | Status: FUNCTIONAL

## 2024-11-08 RX ORDER — NALOXONE HCL 0.4 MG/ML
0.2 VIAL (ML) INJECTION AS NEEDED
Status: DISCONTINUED | OUTPATIENT
Start: 2024-11-08 | End: 2024-11-08 | Stop reason: HOSPADM

## 2024-11-08 RX ORDER — PROMETHAZINE HYDROCHLORIDE 12.5 MG/1
25 TABLET ORAL ONCE AS NEEDED
Status: DISCONTINUED | OUTPATIENT
Start: 2024-11-08 | End: 2024-11-08 | Stop reason: HOSPADM

## 2024-11-08 RX ORDER — OXYCODONE HYDROCHLORIDE 5 MG/1
5 TABLET ORAL EVERY 4 HOURS PRN
Qty: 30 TABLET | Refills: 0 | Status: SHIPPED | OUTPATIENT
Start: 2024-11-08 | End: 2024-11-22

## 2024-11-08 RX ORDER — DIPHENHYDRAMINE HYDROCHLORIDE 50 MG/ML
12.5 INJECTION INTRAMUSCULAR; INTRAVENOUS
Status: DISCONTINUED | OUTPATIENT
Start: 2024-11-08 | End: 2024-11-08 | Stop reason: HOSPADM

## 2024-11-08 RX ORDER — HYDROMORPHONE HYDROCHLORIDE 1 MG/ML
0.5 INJECTION, SOLUTION INTRAMUSCULAR; INTRAVENOUS; SUBCUTANEOUS
Status: DISCONTINUED | OUTPATIENT
Start: 2024-11-08 | End: 2024-11-08 | Stop reason: HOSPADM

## 2024-11-08 RX ORDER — HYDROCODONE BITARTRATE AND ACETAMINOPHEN 5; 325 MG/1; MG/1
1 TABLET ORAL ONCE AS NEEDED
Status: DISCONTINUED | OUTPATIENT
Start: 2024-11-08 | End: 2024-11-08 | Stop reason: HOSPADM

## 2024-11-08 RX ORDER — HYDRALAZINE HYDROCHLORIDE 20 MG/ML
5 INJECTION INTRAMUSCULAR; INTRAVENOUS
Status: DISCONTINUED | OUTPATIENT
Start: 2024-11-08 | End: 2024-11-08 | Stop reason: HOSPADM

## 2024-11-08 RX ORDER — SODIUM CHLORIDE, SODIUM LACTATE, POTASSIUM CHLORIDE, CALCIUM CHLORIDE 600; 310; 30; 20 MG/100ML; MG/100ML; MG/100ML; MG/100ML
9 INJECTION, SOLUTION INTRAVENOUS CONTINUOUS
Status: DISCONTINUED | OUTPATIENT
Start: 2024-11-08 | End: 2024-11-08 | Stop reason: HOSPADM

## 2024-11-08 RX ORDER — OXYCODONE AND ACETAMINOPHEN 7.5; 325 MG/1; MG/1
1 TABLET ORAL EVERY 4 HOURS PRN
Status: DISCONTINUED | OUTPATIENT
Start: 2024-11-08 | End: 2024-11-08 | Stop reason: HOSPADM

## 2024-11-08 RX ORDER — DROPERIDOL 2.5 MG/ML
0.62 INJECTION, SOLUTION INTRAMUSCULAR; INTRAVENOUS
Status: DISCONTINUED | OUTPATIENT
Start: 2024-11-08 | End: 2024-11-08 | Stop reason: HOSPADM

## 2024-11-08 RX ORDER — MIDAZOLAM HYDROCHLORIDE 1 MG/ML
1 INJECTION, SOLUTION INTRAMUSCULAR; INTRAVENOUS
Status: DISCONTINUED | OUTPATIENT
Start: 2024-11-08 | End: 2024-11-08 | Stop reason: HOSPADM

## 2024-11-08 RX ORDER — FENTANYL CITRATE 50 UG/ML
50 INJECTION, SOLUTION INTRAMUSCULAR; INTRAVENOUS
Status: DISCONTINUED | OUTPATIENT
Start: 2024-11-08 | End: 2024-11-08 | Stop reason: HOSPADM

## 2024-11-08 RX ORDER — FAMOTIDINE 10 MG/ML
20 INJECTION, SOLUTION INTRAVENOUS ONCE
Status: COMPLETED | OUTPATIENT
Start: 2024-11-08 | End: 2024-11-08

## 2024-11-08 RX ORDER — PROMETHAZINE HYDROCHLORIDE 25 MG/1
25 SUPPOSITORY RECTAL ONCE AS NEEDED
Status: DISCONTINUED | OUTPATIENT
Start: 2024-11-08 | End: 2024-11-08 | Stop reason: HOSPADM

## 2024-11-08 RX ORDER — LABETALOL HYDROCHLORIDE 5 MG/ML
5 INJECTION, SOLUTION INTRAVENOUS
Status: DISCONTINUED | OUTPATIENT
Start: 2024-11-08 | End: 2024-11-08 | Stop reason: HOSPADM

## 2024-11-08 RX ORDER — SODIUM CHLORIDE 0.9 % (FLUSH) 0.9 %
3 SYRINGE (ML) INJECTION EVERY 12 HOURS SCHEDULED
Status: DISCONTINUED | OUTPATIENT
Start: 2024-11-08 | End: 2024-11-08 | Stop reason: HOSPADM

## 2024-11-08 RX ORDER — FLUMAZENIL 0.1 MG/ML
0.2 INJECTION INTRAVENOUS AS NEEDED
Status: DISCONTINUED | OUTPATIENT
Start: 2024-11-08 | End: 2024-11-08 | Stop reason: HOSPADM

## 2024-11-08 RX ORDER — SODIUM CHLORIDE 0.9 % (FLUSH) 0.9 %
3-10 SYRINGE (ML) INJECTION AS NEEDED
Status: DISCONTINUED | OUTPATIENT
Start: 2024-11-08 | End: 2024-11-08 | Stop reason: HOSPADM

## 2024-11-08 RX ORDER — ONDANSETRON 2 MG/ML
4 INJECTION INTRAMUSCULAR; INTRAVENOUS ONCE AS NEEDED
Status: DISCONTINUED | OUTPATIENT
Start: 2024-11-08 | End: 2024-11-08 | Stop reason: HOSPADM

## 2024-11-08 RX ORDER — DIAZEPAM 5 MG/1
5 TABLET ORAL EVERY 8 HOURS PRN
Qty: 15 TABLET | Refills: 0 | Status: SHIPPED | OUTPATIENT
Start: 2024-11-08 | End: 2024-11-22

## 2024-11-08 RX ORDER — PROPOFOL 10 MG/ML
VIAL (ML) INTRAVENOUS CONTINUOUS PRN
Status: DISCONTINUED | OUTPATIENT
Start: 2024-11-08 | End: 2024-11-08 | Stop reason: SURG

## 2024-11-08 RX ORDER — LIDOCAINE HYDROCHLORIDE 20 MG/ML
INJECTION, SOLUTION INFILTRATION; PERINEURAL AS NEEDED
Status: DISCONTINUED | OUTPATIENT
Start: 2024-11-08 | End: 2024-11-08 | Stop reason: SURG

## 2024-11-08 RX ORDER — FENTANYL CITRATE 50 UG/ML
50 INJECTION, SOLUTION INTRAMUSCULAR; INTRAVENOUS ONCE AS NEEDED
Status: DISCONTINUED | OUTPATIENT
Start: 2024-11-08 | End: 2024-11-08 | Stop reason: HOSPADM

## 2024-11-08 RX ADMIN — LIDOCAINE HYDROCHLORIDE 60 MG: 20 INJECTION, SOLUTION INFILTRATION; PERINEURAL at 08:16

## 2024-11-08 RX ADMIN — SODIUM CHLORIDE, POTASSIUM CHLORIDE, SODIUM LACTATE AND CALCIUM CHLORIDE 9 ML/HR: 600; 310; 30; 20 INJECTION, SOLUTION INTRAVENOUS at 06:43

## 2024-11-08 RX ADMIN — FAMOTIDINE 20 MG: 10 INJECTION INTRAVENOUS at 07:17

## 2024-11-08 RX ADMIN — PROPOFOL 200 MCG/KG/MIN: 10 INJECTION, EMULSION INTRAVENOUS at 08:12

## 2024-11-08 NOTE — OP NOTE
Colorectal & General Surgery  Operative Report    Patient: Billy Cardona  YOB: 1995  MRN: [id number]  DATE OF PROCEDURE: 11/08/24     PREOPERATIVE DIAGNOSIS:  Internal and external hemorrhoids, bleeding    POSTOPERATIVE DIAGNOSIS:  Same    PROCEDURE:  Excisional hemorrhoidectomy, 2 columns, internal and external components    FINDINGS:  Left lateral and right posterior hemorrhoid columns were excised completely.  There were small circumferential internal hemorrhoids right at the dentate line that did not appear to be inflamed.    SURGEON:  Serafin Alicea MD    ASSISTANT:  None    ANESTHESIA:  Monitored anesthesia care    EBL:  15 mL    SPECIMEN:  Hemorrhoids    OPERATIVE DESCRIPTION:  The patient was brought to the operating room under the care of the nursing staff.  The patient was placed on the operating room table in the prone position where anesthesia was induced.  The patient was then prepped and positioned in the usual sterile fashion.  A standardized timeout was then performed.    External exam demonstrated a left lateral and right posterior hemorrhoid columns.  Circumferential anal block performed with half percent Marcaine and Exparel.  Anal retractors were inserted.  The hemorrhoids were relatively flat but certainly had dilated venous components.  The left lateral column was excised completely with the Bovie electrocautery sparing the underlying sphincter complex.  The mucosa was reapproximated with running 3-0 chromic incorporating the underlying sphincter complex.  The right posterior column was also excised completely with the Bovie electrocautery sparing the underlying sphincter complex.  The mucosa was reapproximated with running 3-0 chromic suture incorporating gentle extensor complex.  The remainder of the internal hemorrhoids were very small and not amenable to excisional hemorrhoidectomy as they were relatively circumferential.  Hemostasis was observed.  Gelfoam was  placed.    All needle, sponge, and instrument counts were correct at the end of the case.    The patient tolerated the procedure well and was transferred to the postanesthesia care unit in stable condition.    Serafin Alicea M.D.  Colorectal & General Surgery  [facility]    [address] [place] [address], [address]  [place], [place], [address]  P: [phone]  F: [phone]

## 2024-11-08 NOTE — ANESTHESIA POSTPROCEDURE EVALUATION
Patient: Billy Cardona    Procedure Summary       Date: 11/08/24 Room / Location: [facility] OR [address] [place] [address] MAIN OR    Anesthesia Start: 0811 Anesthesia Stop: 0843    Procedure: HEMORRHOIDECTOMY (Anus) Diagnosis:       Bleeding internal hemorrhoids      (Bleeding internal hemorrhoids [K64.8])    Surgeons: Jose Alicea MD Provider: Alphonso Hager MD    Anesthesia Type: MAC ASA Status: 2            Anesthesia Type: MAC    Vitals  Vitals Value Taken Time   /83 11/08/24 0915   Temp 36.7 °C (98.1 °F) 11/08/24 0840   Pulse 96 11/08/24 0917   Resp 18 11/08/24 0915   SpO2 99 % 11/08/24 0917   Vitals shown include unfiled device data.        Post Anesthesia Care and Evaluation    Patient location during evaluation: PACU  Patient participation: complete - patient participated  Level of consciousness: awake  Pain management: adequate    Airway patency: patent  Anesthetic complications: No anesthetic complications  PONV Status: none  Cardiovascular status: acceptable  Respiratory status: acceptable  Hydration status: acceptable    Comments: Patient seen and examined postoperatively; vital signs stable; SpO2 greater than or equal to 90%; cardiopulmonary status stable; nausea/vomiting adequately controlled; pain adequately controlled; no apparent anesthesia complications; patient discharged from anesthesia care when discharge criteria were met

## 2024-11-08 NOTE — ANESTHESIA PREPROCEDURE EVALUATION
Anesthesia Evaluation     NPO Solid Status: > 8 hours  NPO Liquid Status: > 8 hours           Airway   Mallampati: I  No difficulty expected  Dental      Pulmonary    Cardiovascular   Exercise tolerance: good (4-7 METS)    (+) hypertension      Neuro/Psych  (+) psychiatric history  GI/Hepatic/Renal/Endo    (+) GERD    Musculoskeletal     Abdominal    Substance History      OB/GYN          Other                    Anesthesia Plan    ASA 2     MAC     intravenous induction     Anesthetic plan, risks, benefits, and alternatives have been provided, discussed and informed consent has been obtained with: patient.    CODE STATUS:

## 2024-11-21 ENCOUNTER — OFFICE VISIT (OUTPATIENT)
Dept: SURGERY | Facility: CLINIC | Age: 29
End: 2024-11-21
Payer: COMMERCIAL

## 2024-11-21 VITALS
OXYGEN SATURATION: 98 % | BODY MASS INDEX: 27.11 KG/M2 | SYSTOLIC BLOOD PRESSURE: 118 MMHG | WEIGHT: 183 LBS | HEART RATE: 102 BPM | DIASTOLIC BLOOD PRESSURE: 78 MMHG | HEIGHT: 69 IN

## 2024-11-21 DIAGNOSIS — K64.8 BLEEDING INTERNAL HEMORRHOIDS: Primary | ICD-10-CM

## 2024-11-21 PROCEDURE — 99024 POSTOP FOLLOW-UP VISIT: CPT | Performed by: SURGERY

## 2024-11-21 NOTE — PROGRESS NOTES
Colorectal & General Surgery  Post - Op    Patient: Billy Cardona  YOB: 1995  MRN: [id number]      Assessment  Billy Cardona is a 29 y.o. male status post excisional hemorrhoidectomy was recovered nicely.  No further hematochezia.  Pain is well-controlled.  He is having good bowel function.  I reinforced the importance of a high-fiber diet and supplementing his diet with Metamucil fiber powder on a daily basis.  He is welcome to follow-up with me on an as-needed basis.    History of Present Illness   Billy Cardona is a 29 y.o. male who presents in follow-up after undergoing excisional hemorrhoidectomy with no complaints today.    Vital Signs  Vitals:    11/21/24 0843   BP: 118/78   Pulse: 102   SpO2: 98%        Physical Exam  Constitutional: Resting comfortably, no acute distress  Neck: Supple, trachea midline  Respiratory: No increased work of breathing, Symmetric excursion  Cardiovascular: Well pefursed, no jugular venous distention evident   Abdominal:  Soft, non-tender, non-distended  Lymphatics: No cervical or suprascapular adenopathy  Skin: Warm, dry, no rash on visualized skin surfaces  Musculoskeletal: Symmetric strength, no obvious gross abnormalities  Psychiatric: Alert and oriented ×3, normal affect   Perianal: Small external tag at one of the hemorrhoid sites but otherwise no abnormalities.      Pathology  I have personally reviewed pathology results with the patient demonstrating benign hemorrhoids.    Serafin Alicea MD  Colorectal & General Surgery  [facility]    [address], [address]  [place], [place], [address]  P: [phone]  F: [phone]

## (undated) DEVICE — SYR LUERLOK 30CC

## (undated) DEVICE — TAPE,CLOTH/SILK,CURAD,3"X10YD,LF,40/CS: Brand: CURAD

## (undated) DEVICE — SUT GUT CHRM 3/0 SH 27IN G122H

## (undated) DEVICE — SYR LUERLOK 20CC BX/50

## (undated) DEVICE — JELLY,LUBE,STERILE,FLIP TOP,TUBE,4-OZ: Brand: MEDLINE

## (undated) DEVICE — SINGLE-USE BIOPSY FORCEPS: Brand: RADIAL JAW 4

## (undated) DEVICE — CANN NASL CO2 TRULINK W/O2 A/

## (undated) DEVICE — THE STERILE LIGHT HANDLE COVER IS USED WITH STERIS SURGICAL LIGHTING AND VISUALIZATION SYSTEMS.

## (undated) DEVICE — GOWN,SIRUS,NON REINFRCD,LARGE,SET IN SL: Brand: MEDLINE

## (undated) DEVICE — GLV SURG PREMIERPRO ORTHO LTX PF SZ7 BRN

## (undated) DEVICE — KT VLV BIOGUARD SXN BIOP AIR/H20 CONN 4PC DISP

## (undated) DEVICE — NDL HYPO ECLPS SFTY 22G 1 1/2IN

## (undated) DEVICE — NEEDLE,HYPODERM,SAFETY, 22GX1.5: Brand: MEDLINE

## (undated) DEVICE — SPNG GZ WOVN 4X4IN 12PLY 10/BX STRL

## (undated) DEVICE — GLV SURG SENSICARE PI MIC PF SZ6.5 LF STRL

## (undated) DEVICE — PATIENT RETURN ELECTRODE, SINGLE-USE, CONTACT QUALITY MONITORING, ADULT, WITH 9FT CORD, FOR PATIENTS WEIGING OVER 33LBS. (15KG): Brand: MEGADYNE

## (undated) DEVICE — POSTN CONVOL FM OR TABLE 2X12X20

## (undated) DEVICE — NDL BLNT 18G 1 1/2IN

## (undated) DEVICE — TUBING, SUCTION, 1/4" X 10', STRAIGHT: Brand: MEDLINE

## (undated) DEVICE — THE TORRENT IRRIGATION SCOPE CONNECTOR IS USED WITH THE TORRENT IRRIGATION TUBING TO PROVIDE IRRIGATION FLUIDS SUCH AS STERILE WATER DURING GASTROINTESTINAL ENDOSCOPIC PROCEDURES WHEN USED IN CONJUNCTION WITH AN IRRIGATION PUMP (OR ELECTROSURGICAL UNIT).: Brand: TORRENT

## (undated) DEVICE — SYR LL TP 10ML STRL

## (undated) DEVICE — PANTY KNIT MATERN L/XL

## (undated) DEVICE — SPONGE,LAP,18"X18",DLX,XR,ST,5/PK,40/PK: Brand: MEDLINE

## (undated) DEVICE — BRECKENRIDGE MINOR PROCEDURE: Brand: MEDLINE INDUSTRIES, INC.

## (undated) DEVICE — GLV SURG SENSICARE POLYISPRN W/ALOE PF LF 6.5 GRN STRL

## (undated) DEVICE — SENSR O2 OXIMAX FNGR A/ 18IN NONSTR